# Patient Record
Sex: MALE | Race: WHITE | NOT HISPANIC OR LATINO | Employment: OTHER | RURAL
[De-identification: names, ages, dates, MRNs, and addresses within clinical notes are randomized per-mention and may not be internally consistent; named-entity substitution may affect disease eponyms.]

---

## 2020-08-15 ENCOUNTER — HISTORICAL (OUTPATIENT)
Dept: ADMINISTRATIVE | Facility: HOSPITAL | Age: 70
End: 2020-08-15

## 2020-08-15 LAB
ALBUMIN SERPL BCP-MCNC: 3.7 G/DL (ref 3.5–5)
ALBUMIN/GLOB SERPL: 1.1 {RATIO}
ALP SERPL-CCNC: 88 U/L (ref 45–115)
ALT SERPL W P-5'-P-CCNC: 33 U/L (ref 16–61)
ANION GAP SERPL CALCULATED.3IONS-SCNC: 9 MMOL/L
AST SERPL W P-5'-P-CCNC: 41 U/L (ref 15–37)
BASOPHILS # BLD AUTO: 0.03 X10E3/UL (ref 0–0.2)
BASOPHILS NFR BLD AUTO: 0.5 % (ref 0–1)
BILIRUB SERPL-MCNC: 0.5 MG/DL (ref 0–1.2)
BUN SERPL-MCNC: 12 MG/DL (ref 7–18)
BUN/CREAT SERPL: 10.8
CALCIUM SERPL-MCNC: 8.3 MG/DL (ref 8.5–10.1)
CHLORIDE SERPL-SCNC: 103 MMOL/L (ref 98–107)
CO2 SERPL-SCNC: 30 MMOL/L (ref 21–32)
CREAT SERPL-MCNC: 1.11 MG/DL (ref 0.7–1.3)
EOSINOPHIL # BLD AUTO: 0.14 X10E3/UL (ref 0–0.5)
EOSINOPHIL NFR BLD AUTO: 2.2 % (ref 1–4)
ERYTHROCYTE [DISTWIDTH] IN BLOOD BY AUTOMATED COUNT: 14.8 % (ref 11.5–14.5)
GLOBULIN SER-MCNC: 3.3 G/DL (ref 2–4)
GLUCOSE SERPL-MCNC: 91 MG/DL (ref 74–106)
HCT VFR BLD AUTO: 37.7 % (ref 40–54)
HGB BLD-MCNC: 11.8 G/DL (ref 13.5–18)
IMM GRANULOCYTES # BLD AUTO: 0.02 X10E3/UL (ref 0–0.04)
IMM GRANULOCYTES NFR BLD: 0.3 % (ref 0–0.4)
LYMPHOCYTES # BLD AUTO: 2.06 X10E3/UL (ref 1–4.8)
LYMPHOCYTES NFR BLD AUTO: 32.9 % (ref 27–41)
MAGNESIUM SERPL-MCNC: 2.4 MG/DL (ref 1.7–2.3)
MCH RBC QN AUTO: 26.8 PG (ref 27–31)
MCHC RBC AUTO-ENTMCNC: 31.3 G/DL (ref 32–36)
MCV RBC AUTO: 85.5 FL (ref 80–96)
MONOCYTES # BLD AUTO: 0.98 X10E3/UL (ref 0–0.8)
MONOCYTES NFR BLD AUTO: 15.6 % (ref 2–6)
MPC BLD CALC-MCNC: 9.2 FL (ref 9.4–12.4)
NEUTROPHILS # BLD AUTO: 3.04 X10E3/UL (ref 1.8–7.7)
NEUTROPHILS NFR BLD AUTO: 48.5 % (ref 53–65)
PLATELET # BLD AUTO: 248 X10E3/UL (ref 150–400)
POTASSIUM SERPL-SCNC: 3.9 MMOL/L (ref 3.5–5.1)
PROT SERPL-MCNC: 7 G/DL (ref 6.4–8.2)
RBC # BLD AUTO: 4.41 X10E6/UL (ref 4.6–6.2)
SODIUM SERPL-SCNC: 138 MMOL/L (ref 136–145)
TROPONIN I SERPL-MCNC: <0.017 NG/ML (ref 0–0.06)
WBC # BLD AUTO: 6.27 X10E3/UL (ref 4.5–11)

## 2020-09-29 ENCOUNTER — HISTORICAL (OUTPATIENT)
Dept: ADMINISTRATIVE | Facility: HOSPITAL | Age: 70
End: 2020-09-29

## 2020-09-29 LAB
ALBUMIN SERPL BCP-MCNC: 4.1 G/DL (ref 3.5–5)
ALP SERPL-CCNC: 91 U/L (ref 45–115)
ALT SERPL W P-5'-P-CCNC: 28 U/L (ref 16–61)
ANION GAP SERPL CALCULATED.3IONS-SCNC: 9 MMOL/L (ref 7–16)
AST SERPL W P-5'-P-CCNC: 25 U/L (ref 15–37)
BASOPHILS # BLD AUTO: 0.07 X10E3/UL (ref 0–0.2)
BASOPHILS NFR BLD AUTO: 1 % (ref 0–1)
BILIRUB DIRECT SERPL-MCNC: 0.2 MG/DL (ref 0–0.2)
BILIRUB SERPL-MCNC: 0.6 MG/DL (ref 0–1.2)
BUN SERPL-MCNC: 15 MG/DL (ref 7–18)
CALCIUM SERPL-MCNC: 9.2 MG/DL (ref 8.5–10.1)
CHLORIDE SERPL-SCNC: 104 MMOL/L (ref 98–107)
CO2 SERPL-SCNC: 31 MMOL/L (ref 21–32)
CREAT SERPL-MCNC: 1.11 MG/DL (ref 0.7–1.3)
EOSINOPHIL # BLD AUTO: 0.17 X10E3/UL (ref 0–0.5)
EOSINOPHIL NFR BLD AUTO: 2.4 % (ref 1–4)
ERYTHROCYTE [DISTWIDTH] IN BLOOD BY AUTOMATED COUNT: 15 % (ref 11.5–14.5)
GLUCOSE SERPL-MCNC: 96 MG/DL (ref 74–106)
HCT VFR BLD AUTO: 42.6 % (ref 40–54)
HGB BLD-MCNC: 13.4 G/DL (ref 13.5–18)
IMM GRANULOCYTES # BLD AUTO: 0.01 X10E3/UL (ref 0–0.04)
IMM GRANULOCYTES NFR BLD: 0.1 % (ref 0–0.4)
LYMPHOCYTES # BLD AUTO: 2.2 X10E3/UL (ref 1–4.8)
LYMPHOCYTES NFR BLD AUTO: 31.6 % (ref 27–41)
MCH RBC QN AUTO: 26.4 PG (ref 27–31)
MCHC RBC AUTO-ENTMCNC: 31.5 G/DL (ref 32–36)
MCV RBC AUTO: 83.9 FL (ref 80–96)
MONOCYTES # BLD AUTO: 0.83 X10E3/UL (ref 0–0.8)
MONOCYTES NFR BLD AUTO: 11.9 % (ref 2–6)
MPC BLD CALC-MCNC: 10.7 FL (ref 9.4–12.4)
NEUTROPHILS # BLD AUTO: 3.68 X10E3/UL (ref 1.8–7.7)
NEUTROPHILS NFR BLD AUTO: 53 % (ref 53–65)
NRBC # BLD AUTO: 0 X10E3/UL (ref 0–0)
NRBC, AUTO (.00): 0 /100 (ref 0–0)
PLATELET # BLD AUTO: 234 X10E3/UL (ref 150–400)
POTASSIUM SERPL-SCNC: 3.6 MMOL/L (ref 3.5–5.1)
PROT SERPL-MCNC: 7.4 G/DL (ref 6.4–8.2)
RBC # BLD AUTO: 5.08 X10E6/UL (ref 4.6–6.2)
SODIUM SERPL-SCNC: 140 MMOL/L (ref 136–145)
WBC # BLD AUTO: 6.96 X10E3/UL (ref 4.5–11)

## 2021-02-03 ENCOUNTER — TELEPHONE (OUTPATIENT)
Dept: NEUROLOGY | Facility: CLINIC | Age: 71
End: 2021-02-03

## 2021-02-12 ENCOUNTER — TELEPHONE (OUTPATIENT)
Dept: NEUROLOGY | Facility: CLINIC | Age: 71
End: 2021-02-12

## 2021-02-17 ENCOUNTER — HISTORICAL (OUTPATIENT)
Dept: ADMINISTRATIVE | Facility: HOSPITAL | Age: 71
End: 2021-02-17

## 2021-02-17 LAB — SARS-COV+SARS-COV-2 AG RESP QL IA.RAPID: NEGATIVE

## 2021-02-22 ENCOUNTER — HISTORICAL (OUTPATIENT)
Dept: ADMINISTRATIVE | Facility: HOSPITAL | Age: 71
End: 2021-02-22

## 2021-02-22 LAB — SARS-COV+SARS-COV-2 AG RESP QL IA.RAPID: NEGATIVE

## 2021-06-19 ENCOUNTER — HOSPITAL ENCOUNTER (EMERGENCY)
Facility: HOSPITAL | Age: 71
Discharge: HOME | End: 2021-06-19
Attending: EMERGENCY MEDICINE
Payer: MEDICARE

## 2021-06-19 VITALS
WEIGHT: 180 LBS | SYSTOLIC BLOOD PRESSURE: 145 MMHG | HEIGHT: 67 IN | TEMPERATURE: 98 F | DIASTOLIC BLOOD PRESSURE: 95 MMHG | BODY MASS INDEX: 28.25 KG/M2 | RESPIRATION RATE: 18 BRPM | OXYGEN SATURATION: 96 % | HEART RATE: 69 BPM

## 2021-06-19 DIAGNOSIS — R07.89 CHEST WALL PAIN: Primary | ICD-10-CM

## 2021-06-19 DIAGNOSIS — R07.9 CHEST PAIN: ICD-10-CM

## 2021-06-19 LAB
ALBUMIN SERPL BCP-MCNC: 3.9 G/DL (ref 3.5–5)
ALBUMIN/GLOB SERPL: 1.2 {RATIO}
ALP SERPL-CCNC: 94 U/L (ref 45–115)
ALT SERPL W P-5'-P-CCNC: 34 U/L (ref 16–61)
ANION GAP SERPL CALCULATED.3IONS-SCNC: 12 MMOL/L (ref 7–16)
AST SERPL W P-5'-P-CCNC: 29 U/L (ref 15–37)
BASOPHILS # BLD AUTO: 0.02 K/UL (ref 0–0.2)
BASOPHILS NFR BLD AUTO: 0.3 % (ref 0–1)
BILIRUB SERPL-MCNC: 0.5 MG/DL (ref 0–1.2)
BUN SERPL-MCNC: 10 MG/DL (ref 7–18)
BUN/CREAT SERPL: 9 (ref 6–20)
CALCIUM SERPL-MCNC: 9 MG/DL (ref 8.5–10.1)
CHLORIDE SERPL-SCNC: 102 MMOL/L (ref 98–107)
CO2 SERPL-SCNC: 28 MMOL/L (ref 21–32)
CREAT SERPL-MCNC: 1.07 MG/DL (ref 0.7–1.3)
DIFFERENTIAL METHOD BLD: ABNORMAL
EOSINOPHIL # BLD AUTO: 0.17 K/UL (ref 0–0.5)
EOSINOPHIL NFR BLD AUTO: 2.6 % (ref 1–4)
ERYTHROCYTE [DISTWIDTH] IN BLOOD BY AUTOMATED COUNT: 14.9 % (ref 11.5–14.5)
GLOBULIN SER-MCNC: 3.2 G/DL (ref 2–4)
GLUCOSE SERPL-MCNC: 98 MG/DL (ref 74–106)
HCT VFR BLD AUTO: 38.5 % (ref 40–54)
HGB BLD-MCNC: 12.5 G/DL (ref 13.5–18)
IMM GRANULOCYTES # BLD AUTO: 0.02 K/UL (ref 0–0.04)
IMM GRANULOCYTES NFR BLD: 0.3 % (ref 0–0.4)
LYMPHOCYTES # BLD AUTO: 2.02 K/UL (ref 1–4.8)
LYMPHOCYTES NFR BLD AUTO: 30.9 % (ref 27–41)
MCH RBC QN AUTO: 27.2 PG (ref 27–31)
MCHC RBC AUTO-ENTMCNC: 32.5 G/DL (ref 32–36)
MCV RBC AUTO: 83.9 FL (ref 80–96)
MONOCYTES # BLD AUTO: 0.73 K/UL (ref 0–0.8)
MONOCYTES NFR BLD AUTO: 11.2 % (ref 2–6)
MPC BLD CALC-MCNC: 9.3 FL (ref 9.4–12.4)
NEUTROPHILS # BLD AUTO: 3.57 K/UL (ref 1.8–7.7)
NEUTROPHILS NFR BLD AUTO: 54.7 % (ref 53–65)
NT-PROBNP SERPL-MCNC: 94 PG/ML (ref 1–125)
PLATELET # BLD AUTO: 260 K/UL (ref 150–400)
POTASSIUM SERPL-SCNC: 3.6 MMOL/L (ref 3.5–5.1)
PROT SERPL-MCNC: 7.1 G/DL (ref 6.4–8.2)
RBC # BLD AUTO: 4.59 M/UL (ref 4.6–6.2)
SODIUM SERPL-SCNC: 138 MMOL/L (ref 136–145)
TROPONIN I SERPL-MCNC: <0.017 NG/ML
WBC # BLD AUTO: 6.53 K/UL (ref 4.5–11)

## 2021-06-19 PROCEDURE — 25000003 PHARM REV CODE 250: Performed by: EMERGENCY MEDICINE

## 2021-06-19 PROCEDURE — 84484 ASSAY OF TROPONIN QUANT: CPT | Performed by: EMERGENCY MEDICINE

## 2021-06-19 PROCEDURE — 83880 ASSAY OF NATRIURETIC PEPTIDE: CPT | Performed by: EMERGENCY MEDICINE

## 2021-06-19 PROCEDURE — 99283 EMERGENCY DEPT VISIT LOW MDM: CPT | Performed by: EMERGENCY MEDICINE

## 2021-06-19 PROCEDURE — 99285 EMERGENCY DEPT VISIT HI MDM: CPT | Mod: 25

## 2021-06-19 PROCEDURE — 93010 ELECTROCARDIOGRAM REPORT: CPT | Mod: ,,, | Performed by: EMERGENCY MEDICINE

## 2021-06-19 PROCEDURE — 85025 COMPLETE CBC W/AUTO DIFF WBC: CPT | Performed by: EMERGENCY MEDICINE

## 2021-06-19 PROCEDURE — 93010 EKG 12-LEAD: ICD-10-PCS | Mod: ,,, | Performed by: EMERGENCY MEDICINE

## 2021-06-19 PROCEDURE — 80053 COMPREHEN METABOLIC PANEL: CPT | Performed by: EMERGENCY MEDICINE

## 2021-06-19 PROCEDURE — 93005 ELECTROCARDIOGRAM TRACING: CPT

## 2021-06-19 PROCEDURE — 36415 COLL VENOUS BLD VENIPUNCTURE: CPT | Performed by: EMERGENCY MEDICINE

## 2021-06-19 RX ORDER — ALBUTEROL SULFATE 90 UG/1
2 AEROSOL, METERED RESPIRATORY (INHALATION)
COMMUNITY
Start: 2020-12-28

## 2021-06-19 RX ORDER — NAPROXEN 500 MG/1
500 TABLET ORAL 2 TIMES DAILY PRN
Qty: 10 TABLET | Refills: 0 | Status: SHIPPED | OUTPATIENT
Start: 2021-06-19 | End: 2021-06-24

## 2021-06-19 RX ORDER — OXYCODONE HYDROCHLORIDE 15 MG/1
15 TABLET ORAL 3 TIMES DAILY
COMMUNITY
Start: 2021-01-14

## 2021-06-19 RX ORDER — EPINEPHRINE 0.3 MG/.3ML
0.3 INJECTION SUBCUTANEOUS
COMMUNITY
End: 2021-06-19

## 2021-06-19 RX ORDER — SILDENAFIL CITRATE 20 MG/1
20 TABLET ORAL 2 TIMES DAILY
COMMUNITY

## 2021-06-19 RX ORDER — BACLOFEN 10 MG/1
10 TABLET ORAL 2 TIMES DAILY
COMMUNITY
Start: 2021-01-14 | End: 2022-04-09 | Stop reason: ALTCHOICE

## 2021-06-19 RX ORDER — ALBUTEROL SULFATE 0.83 MG/ML
2.5 SOLUTION RESPIRATORY (INHALATION)
COMMUNITY

## 2021-06-19 RX ORDER — PREDNISOLONE ACETATE 10 MG/ML
1 SUSPENSION/ DROPS OPHTHALMIC 4 TIMES DAILY
COMMUNITY

## 2021-06-19 RX ORDER — ASPIRIN 325 MG
325 TABLET ORAL
Status: COMPLETED | OUTPATIENT
Start: 2021-06-19 | End: 2021-06-19

## 2021-06-19 RX ORDER — DULOXETIN HYDROCHLORIDE 60 MG/1
60 CAPSULE, DELAYED RELEASE ORAL DAILY
COMMUNITY
End: 2021-06-19

## 2021-06-19 RX ORDER — PANTOPRAZOLE SODIUM 40 MG/1
40 TABLET, DELAYED RELEASE ORAL DAILY
COMMUNITY
Start: 2020-12-31 | End: 2022-04-09 | Stop reason: ALTCHOICE

## 2021-06-19 RX ADMIN — SODIUM CHLORIDE 500 ML: 0.9 INJECTION, SOLUTION INTRAVENOUS at 09:06

## 2021-06-19 RX ADMIN — ASPIRIN 325 MG ORAL TABLET 325 MG: 325 PILL ORAL at 09:06

## 2021-06-20 ENCOUNTER — TELEPHONE (OUTPATIENT)
Dept: EMERGENCY MEDICINE | Facility: HOSPITAL | Age: 71
End: 2021-06-20

## 2021-09-20 DIAGNOSIS — M26.629 ARTHRALGIA OF TEMPOROMANDIBULAR JOINT: Primary | ICD-10-CM

## 2021-09-21 ENCOUNTER — CLINICAL SUPPORT (OUTPATIENT)
Dept: REHABILITATION | Facility: HOSPITAL | Age: 71
End: 2021-09-21
Payer: MEDICARE

## 2021-09-21 DIAGNOSIS — R68.84 CHRONIC JAW PAIN: ICD-10-CM

## 2021-09-21 DIAGNOSIS — M26.622 ARTHRALGIA OF LEFT TEMPOROMANDIBULAR JOINT: ICD-10-CM

## 2021-09-21 DIAGNOSIS — M26.629 ARTHRALGIA OF TEMPOROMANDIBULAR JOINT: ICD-10-CM

## 2021-09-21 DIAGNOSIS — G89.29 CHRONIC JAW PAIN: ICD-10-CM

## 2021-09-21 PROCEDURE — 97110 THERAPEUTIC EXERCISES: CPT

## 2021-09-21 PROCEDURE — 97035 APP MDLTY 1+ULTRASOUND EA 15: CPT

## 2021-09-21 PROCEDURE — 97014 ELECTRIC STIMULATION THERAPY: CPT

## 2021-09-23 ENCOUNTER — CLINICAL SUPPORT (OUTPATIENT)
Dept: REHABILITATION | Facility: HOSPITAL | Age: 71
End: 2021-09-23
Payer: MEDICARE

## 2021-09-23 DIAGNOSIS — G89.29 CHRONIC JAW PAIN: ICD-10-CM

## 2021-09-23 DIAGNOSIS — R68.84 CHRONIC JAW PAIN: ICD-10-CM

## 2021-09-23 PROCEDURE — 97110 THERAPEUTIC EXERCISES: CPT

## 2021-09-23 PROCEDURE — 97035 APP MDLTY 1+ULTRASOUND EA 15: CPT

## 2021-10-05 ENCOUNTER — DOCUMENTATION ONLY (OUTPATIENT)
Dept: REHABILITATION | Facility: HOSPITAL | Age: 71
End: 2021-10-05

## 2021-10-05 PROBLEM — G89.29 CHRONIC JAW PAIN: Status: RESOLVED | Noted: 2021-09-21 | Resolved: 2021-10-05

## 2021-10-05 PROBLEM — R68.84 CHRONIC JAW PAIN: Status: RESOLVED | Noted: 2021-09-21 | Resolved: 2021-10-05

## 2021-10-25 ENCOUNTER — OFFICE VISIT (OUTPATIENT)
Dept: OTOLARYNGOLOGY | Facility: CLINIC | Age: 71
End: 2021-10-25
Payer: MEDICARE

## 2021-10-25 VITALS — HEIGHT: 69 IN | WEIGHT: 179 LBS | BODY MASS INDEX: 26.51 KG/M2

## 2021-10-25 DIAGNOSIS — G51.8 FACIAL NEURALGIA: ICD-10-CM

## 2021-10-25 DIAGNOSIS — J32.8 OTHER CHRONIC SINUSITIS: Primary | ICD-10-CM

## 2021-10-25 PROCEDURE — 99204 PR OFFICE/OUTPT VISIT, NEW, LEVL IV, 45-59 MIN: ICD-10-PCS | Mod: S$PBB,,, | Performed by: OTOLARYNGOLOGY

## 2021-10-25 PROCEDURE — 99204 OFFICE O/P NEW MOD 45 MIN: CPT | Mod: S$PBB,,, | Performed by: OTOLARYNGOLOGY

## 2021-10-25 PROCEDURE — 99213 OFFICE O/P EST LOW 20 MIN: CPT | Mod: PBBFAC | Performed by: OTOLARYNGOLOGY

## 2022-04-09 ENCOUNTER — HOSPITAL ENCOUNTER (EMERGENCY)
Facility: HOSPITAL | Age: 72
Discharge: HOME | End: 2022-04-09
Attending: FAMILY MEDICINE
Payer: MEDICARE

## 2022-04-09 VITALS
OXYGEN SATURATION: 98 % | SYSTOLIC BLOOD PRESSURE: 121 MMHG | DIASTOLIC BLOOD PRESSURE: 68 MMHG | BODY MASS INDEX: 29.03 KG/M2 | TEMPERATURE: 98 F | HEIGHT: 67 IN | HEART RATE: 87 BPM | RESPIRATION RATE: 18 BRPM | WEIGHT: 185 LBS

## 2022-04-09 DIAGNOSIS — S02.40CB: ICD-10-CM

## 2022-04-09 DIAGNOSIS — M54.2 NECK PAIN: ICD-10-CM

## 2022-04-09 DIAGNOSIS — S02.40EA CLOSED FRACTURE OF RIGHT ZYGOMATIC ARCH, INITIAL ENCOUNTER: ICD-10-CM
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
## 2022-04-09 DIAGNOSIS — M54.9 DORSALGIA: ICD-10-CM

## 2022-04-09 DIAGNOSIS — S02.40DB: ICD-10-CM

## 2022-04-09 DIAGNOSIS — R51.9 FACIAL PAIN, ACUTE: ICD-10-CM

## 2022-04-09 DIAGNOSIS — S02.85XB: ICD-10-CM

## 2022-04-09 DIAGNOSIS — R22.0 FACIAL SWELLING: ICD-10-CM

## 2022-04-09 PROCEDURE — 99285 EMERGENCY DEPT VISIT HI MDM: CPT | Mod: 25

## 2022-04-09 PROCEDURE — 29130 APPL FINGER SPLINT STATIC: CPT

## 2022-04-09 PROCEDURE — 99284 EMERGENCY DEPT VISIT MOD MDM: CPT | Performed by: FAMILY MEDICINE

## 2022-04-09 PROCEDURE — 63600175 PHARM REV CODE 636 W HCPCS: Performed by: FAMILY MEDICINE

## 2022-04-09 PROCEDURE — 96372 THER/PROPH/DIAG INJ SC/IM: CPT

## 2022-04-09 RX ORDER — NAPROXEN 500 MG/1
500 TABLET ORAL 2 TIMES DAILY WITH MEALS
COMMUNITY

## 2022-04-09 RX ORDER — TADALAFIL 20 MG/1
10 TABLET ORAL DAILY
COMMUNITY

## 2022-04-09 RX ORDER — DULOXETIN HYDROCHLORIDE 60 MG/1
60 CAPSULE, DELAYED RELEASE ORAL DAILY
COMMUNITY

## 2022-04-09 RX ORDER — CYANOCOBALAMIN 1000 UG/ML
1000 INJECTION, SOLUTION INTRAMUSCULAR; SUBCUTANEOUS
COMMUNITY

## 2022-04-09 RX ORDER — NORTRIPTYLINE HYDROCHLORIDE 50 MG/1
50 CAPSULE ORAL NIGHTLY
COMMUNITY

## 2022-04-09 RX ORDER — EPINEPHRINE 0.15 MG/.3ML
0.15 INJECTION INTRAMUSCULAR
COMMUNITY

## 2022-04-09 RX ORDER — BUDESONIDE AND FORMOTEROL FUMARATE DIHYDRATE 160; 4.5 UG/1; UG/1
2 AEROSOL RESPIRATORY (INHALATION) EVERY 12 HOURS
COMMUNITY

## 2022-04-09 RX ORDER — EPLERENONE 25 MG/1
25 TABLET, FILM COATED ORAL DAILY
COMMUNITY

## 2022-04-09 RX ORDER — SUMATRIPTAN SUCCINATE 100 MG/1
100 TABLET ORAL DAILY PRN
COMMUNITY

## 2022-04-09 RX ORDER — NALOXONE HYDROCHLORIDE 4 MG/.1ML
1 SPRAY NASAL DAILY PRN
COMMUNITY

## 2022-04-09 RX ORDER — ESOMEPRAZOLE MAGNESIUM 40 MG/1
40 CAPSULE, DELAYED RELEASE ORAL
COMMUNITY

## 2022-04-09 RX ORDER — CEFTRIAXONE 1 G/1
1 INJECTION, POWDER, FOR SOLUTION INTRAMUSCULAR; INTRAVENOUS
Status: COMPLETED | OUTPATIENT
Start: 2022-04-09 | End: 2022-04-09

## 2022-04-09 RX ORDER — AMOXICILLIN AND CLAVULANATE POTASSIUM 875; 125 MG/1; MG/1
1 TABLET, FILM COATED ORAL 2 TIMES DAILY
Qty: 14 TABLET | Refills: 0 | Status: SHIPPED | OUTPATIENT
Start: 2022-04-09

## 2022-04-09 RX ADMIN — CEFTRIAXONE SODIUM 1 G: 1 INJECTION, POWDER, FOR SOLUTION INTRAMUSCULAR; INTRAVENOUS at 05:04

## 2022-04-09 NOTE — ED NOTES
PREPARING PT FOR DISCHARGE IV DISCONTINUED- MD ORDERED IV ROCEPHIN - MD AWARE IV ALREADY DISCONTINUED - ROCEPHIN CHANGED TO IM INJECTION AS ORDERED PER MD- PT GIVEN MED WITHOUT DIFFICULTY

## 2022-04-09 NOTE — ED NOTES
Pt requested to be transferred to [facility] in [place]- [facility] declined due to full capacity- pt to aware- pt states ok to go to [facility] - [facility] aware of pt ok to go [facility]

## 2022-04-09 NOTE — DISCHARGE INSTRUCTIONS
Augmentin, 875mg.  Take 1 every 12 hrs for 10 days.  DO NOT blow nose, increase pressure to nose/mouth, etc.    F/u with Dr. Diggs, otolaryngologist (ENT) specialist, next available appointment.  [phone]  Return sooner if fever, unable to breathe, or otherwise worse.

## 2022-04-09 NOTE — PROVIDER PROGRESS NOTES - EMERGENCY DEPT.
Encounter Date: 4/9/2022    ED Physician Progress Notes        Physician Note:   Dr. Diggs, ENT on call for pt's preferred hospital.  He looked at the patient's CT scans and said this is not operative.  Dr. Diggs definitely recommends ENT follow up, but he recommended pt d/c home with ENT follow up in clinic.  Discussed with pt.

## 2022-04-09 NOTE — ED PROVIDER NOTES
Encounter Date: 4/9/2022       History   No chief complaint on file.    Pt was letting his dog out and he turned which caused his feet to bind up and he fell.  He hit his face in the nose area causing nosebleed and a black eye on the right.  His only known blood thinner is aspirin and an NSAID that he takes for arthritis.  He c/o neck and back pain, facial pain and swelling.  No LOC.          Review of patient's allergies indicates:   Allergen Reactions    Ace inhibitors     Arb-angiotensin receptor antagonist      Past Medical History:   Diagnosis Date    Arthritis     Asthma     Cancer     Hypertension      Past Surgical History:   Procedure Laterality Date    BACK SURGERY      BLADDER SURGERY      CARPAL TUNNEL RELEASE      CATARACT EXTRACTION, BILATERAL      CHOLECYSTECTOMY      KNEE SURGERY      Occipital craniectomy      SHOULDER SURGERY      SINUS SURGERY       Family History   Problem Relation Age of Onset    Heart failure Mother     Heart disease Mother     Stroke Mother     Cancer Father     Heart failure Father     Heart disease Father     Diabetes Sister     Heart failure Sister     Heart disease Sister     Diabetes Paternal Aunt     Colon cancer Neg Hx     Colon polyps Neg Hx     Stomach cancer Neg Hx     Rectal cancer Neg Hx     Esophageal cancer Neg Hx      Social History     Tobacco Use    Smoking status: Never Smoker    Smokeless tobacco: Never Used   Substance Use Topics    Alcohol use: Yes     Comment: occasional drinker    Drug use: No     Review of Systems   HENT: Positive for facial swelling and nosebleeds (upon initial injury.  Hemostatic upon arrival in ED.).    Musculoskeletal: Positive for back pain and neck pain.   All other systems reviewed and are negative.      Physical Exam     Initial Vitals [04/09/22 1326]   BP Pulse Resp Temp SpO2   (!) 131/93 99 18 97.7 °F (36.5 °C) (!) 93 %      MAP       --         Physical Exam    Nursing note and vitals  reviewed.  Constitutional: He appears well-developed and well-nourished.   HENT:   Head: Normocephalic.   Mild swelling around bridge of nose, and moderate swelling of right upper and lower eyelids with ecchymosis noted.    Eyes: EOM are normal.   Pupils pinpoint.  (Pt is on oxycodone for chronic pain.)   Neck: Neck supple. No tracheal deviation present. No JVD present.   Normal range of motion.  Cardiovascular: Normal rate, regular rhythm, normal heart sounds and intact distal pulses. Exam reveals no gallop and no friction rub.    No murmur heard.  Pulmonary/Chest: Breath sounds normal. No stridor. No respiratory distress. He has no wheezes. He has no rhonchi. He has no rales.   BS are equal bilat.   Abdominal: Abdomen is soft. Bowel sounds are normal. He exhibits no distension. There is no abdominal tenderness. There is no rebound and no guarding.   Musculoskeletal:         General: No tenderness or edema. Normal range of motion.      Cervical back: Normal range of motion and neck supple.     Neurological: He is alert and oriented to person, place, and time.   Skin: Skin is warm and dry. Capillary refill takes less than 2 seconds.   Psychiatric: He has a normal mood and affect.         Medical Screening Exam   See Full Note    ED Course   Procedures  Labs Reviewed - No data to display       Imaging Results    None          Medications - No data to display                    Clinical Impression:   Final diagnoses:  [W19.XXXA] Fall, initial encounter (Primary)  [R51.9] Facial pain, acute  [R22.0] Facial swelling  [M54.2] Neck pain  [M54.9] Dorsalgia                 Renaldo Soriano MD  04/09/22 3750

## 2022-04-09 NOTE — ED TRIAGE NOTES
Pt received via [facility] with c/o fall prior to arrival- pt states he was getting up to let his dog out- pt states he stumbled and fell against the end table -denies loc

## 2022-04-09 NOTE — ED NOTES
Pt states does no need anything for pain - pain states I am under the care of pain management dr guan on mobile

## 2022-04-09 NOTE — ED NOTES
REMOVED DRIED BLOOD FROM FACE, LIPS , HANDS, AND CHEST AREA - CLEANSED PT WITH WARM WATER AND HIBICLENS WITH 4X4'S- PT TOLERATED - PT GIVEN SIPS OF SPRITE- PT TOLERATED WITHOUT DIFFICULTY - PT RIDE CALLED AT [phone]

## 2022-04-09 NOTE — ED NOTES
Pt quite in room - pt voices no complaints - swelling noted to facial area and bruising - md to further review ct reports - continuing to monitor and assist pt as needed

## 2022-04-10 ENCOUNTER — TELEPHONE (OUTPATIENT)
Dept: EMERGENCY MEDICINE | Facility: HOSPITAL | Age: 72
End: 2022-04-10
Payer: MEDICARE

## 2023-09-27 ENCOUNTER — OFFICE VISIT (OUTPATIENT)
Dept: OTOLARYNGOLOGY | Facility: CLINIC | Age: 73
End: 2023-09-27
Payer: MEDICARE

## 2023-09-27 VITALS — HEIGHT: 67 IN | BODY MASS INDEX: 29.03 KG/M2 | WEIGHT: 185 LBS

## 2023-09-27 DIAGNOSIS — R09.81 CHRONIC NASAL CONGESTION: ICD-10-CM

## 2023-09-27 DIAGNOSIS — J32.8 OTHER CHRONIC SINUSITIS: ICD-10-CM

## 2023-09-27 DIAGNOSIS — H92.12 OTORRHEA OF LEFT EAR: Primary | ICD-10-CM

## 2023-09-27 PROCEDURE — 99214 OFFICE O/P EST MOD 30 MIN: CPT | Mod: PBBFAC | Performed by: OTOLARYNGOLOGY

## 2023-09-27 PROCEDURE — 99214 OFFICE O/P EST MOD 30 MIN: CPT | Mod: S$PBB,,, | Performed by: OTOLARYNGOLOGY

## 2023-09-27 PROCEDURE — 99214 PR OFFICE/OUTPT VISIT, EST, LEVL IV, 30-39 MIN: ICD-10-PCS | Mod: S$PBB,,, | Performed by: OTOLARYNGOLOGY

## 2023-09-27 RX ORDER — NEOMYCIN SULFATE, POLYMYXIN B SULFATE AND HYDROCORTISONE 10; 3.5; 1 MG/ML; MG/ML; [USP'U]/ML
3 SUSPENSION/ DROPS AURICULAR (OTIC) 2 TIMES DAILY
Qty: 10 ML | Refills: 0 | Status: SHIPPED | OUTPATIENT
Start: 2023-09-27

## 2023-09-27 RX ORDER — AZELASTINE 1 MG/ML
1 SPRAY, METERED NASAL 2 TIMES DAILY
Qty: 30 ML | Refills: 0 | Status: SHIPPED | OUTPATIENT
Start: 2023-09-27 | End: 2024-09-26

## 2023-09-27 RX ORDER — AMOXICILLIN AND CLAVULANATE POTASSIUM 500; 125 MG/1; MG/1
1 TABLET, FILM COATED ORAL 2 TIMES DAILY
Qty: 28 TABLET | Refills: 0 | Status: SHIPPED | OUTPATIENT
Start: 2023-09-27

## 2023-10-11 ENCOUNTER — OFFICE VISIT (OUTPATIENT)
Dept: OTOLARYNGOLOGY | Facility: CLINIC | Age: 73
End: 2023-10-11
Payer: MEDICARE

## 2023-10-11 VITALS — WEIGHT: 185 LBS | BODY MASS INDEX: 29.03 KG/M2 | HEIGHT: 67 IN

## 2023-10-11 DIAGNOSIS — H92.12 OTORRHEA OF LEFT EAR: ICD-10-CM

## 2023-10-11 DIAGNOSIS — R09.81 CHRONIC NASAL CONGESTION: Primary | ICD-10-CM

## 2023-10-11 DIAGNOSIS — J32.8 OTHER CHRONIC SINUSITIS: ICD-10-CM

## 2023-10-11 PROCEDURE — 99214 PR OFFICE/OUTPT VISIT, EST, LEVL IV, 30-39 MIN: ICD-10-PCS | Mod: S$PBB,,, | Performed by: OTOLARYNGOLOGY

## 2023-10-11 PROCEDURE — 99214 OFFICE O/P EST MOD 30 MIN: CPT | Mod: PBBFAC | Performed by: OTOLARYNGOLOGY

## 2023-10-11 PROCEDURE — 99214 OFFICE O/P EST MOD 30 MIN: CPT | Mod: S$PBB,,, | Performed by: OTOLARYNGOLOGY

## 2023-10-11 RX ORDER — AMOXICILLIN AND CLAVULANATE POTASSIUM 500; 125 MG/1; MG/1
1 TABLET, FILM COATED ORAL 2 TIMES DAILY
Qty: 28 TABLET | Refills: 0 | Status: SHIPPED | OUTPATIENT
Start: 2023-10-11

## 2023-10-11 NOTE — PROGRESS NOTES
Subjective:       Patient ID: Raul Rodriguez is a 73 y.o. male.    Chief Complaint: Sinusitis (2 week follow-up on sinuses and left ear drainage. )  Coughing up yellow drainage  Sinusitis  Associated symptoms include ear pain and sinus pressure.     Review of Systems   HENT:  Positive for ear discharge, ear pain, postnasal drip, rhinorrhea and sinus pressure.    All other systems reviewed and are negative.      Objective:      Physical Exam  General: NAD  Head: Normocephalic, atraumatic, no facial asymmetry/normal strength,  Ears: Both auricules normal in appearance, w/o deformities tympanic membranes left fluid external auditory canals normal  Nose: External nose w/o deformities normal turbinates no drainage or inflammation  Oral Cavity: Lips, gums, floor of mouth, tongue hard palate, and buccal mucosa without mass/lesion  Oropharynx: Mucosa pink and moist, soft palate, posterior pharynx and oropharyngeal wall without mass/lesion  Neck: Supple, symmetric, trachea midline, no palpable mass/lesion, no palpable cervical lymphadenopathy  Skin: Warm and dry, no concerning lesions  Respiratory: Respirations even, unlabored   Assessment:       1. Chronic nasal congestion    2. Otorrhea of left ear    3. Other chronic sinusitis        Plan:     Canal is better stop drops  Augmentin   F/u 1 month

## 2023-11-08 ENCOUNTER — OFFICE VISIT (OUTPATIENT)
Dept: OTOLARYNGOLOGY | Facility: CLINIC | Age: 73
End: 2023-11-08
Payer: MEDICARE

## 2023-11-08 VITALS — WEIGHT: 185 LBS | HEIGHT: 67 IN | BODY MASS INDEX: 29.03 KG/M2

## 2023-11-08 DIAGNOSIS — H65.22 LEFT CHRONIC SEROUS OTITIS MEDIA: ICD-10-CM

## 2023-11-08 DIAGNOSIS — H90.2 CONDUCTIVE HEARING LOSS, UNSPECIFIED LATERALITY: ICD-10-CM

## 2023-11-08 DIAGNOSIS — R09.81 CHRONIC NASAL CONGESTION: Primary | ICD-10-CM

## 2023-11-08 DIAGNOSIS — J32.8 OTHER CHRONIC SINUSITIS: ICD-10-CM

## 2023-11-08 PROCEDURE — 99214 OFFICE O/P EST MOD 30 MIN: CPT | Mod: PBBFAC | Performed by: OTOLARYNGOLOGY

## 2023-11-08 PROCEDURE — 99214 PR OFFICE/OUTPT VISIT, EST, LEVL IV, 30-39 MIN: ICD-10-PCS | Mod: S$PBB,,, | Performed by: OTOLARYNGOLOGY

## 2023-11-08 PROCEDURE — 99214 OFFICE O/P EST MOD 30 MIN: CPT | Mod: S$PBB,,, | Performed by: OTOLARYNGOLOGY

## 2023-11-08 RX ORDER — AZELASTINE 1 MG/ML
1 SPRAY, METERED NASAL 2 TIMES DAILY
Qty: 30 ML | Refills: 1 | Status: SHIPPED | OUTPATIENT
Start: 2023-11-08 | End: 2024-11-07

## 2023-12-20 ENCOUNTER — OFFICE VISIT (OUTPATIENT)
Dept: OTOLARYNGOLOGY | Facility: CLINIC | Age: 73
End: 2023-12-20
Payer: MEDICARE

## 2023-12-20 VITALS — BODY MASS INDEX: 29.03 KG/M2 | HEIGHT: 67 IN | WEIGHT: 185 LBS

## 2023-12-20 DIAGNOSIS — H69.92 ETD (EUSTACHIAN TUBE DYSFUNCTION), LEFT: ICD-10-CM

## 2023-12-20 DIAGNOSIS — H65.22 LEFT CHRONIC SEROUS OTITIS MEDIA: Primary | ICD-10-CM

## 2023-12-20 DIAGNOSIS — H90.2 CONDUCTIVE HEARING LOSS, UNSPECIFIED LATERALITY: ICD-10-CM

## 2023-12-20 PROCEDURE — 99214 PR OFFICE/OUTPT VISIT, EST, LEVL IV, 30-39 MIN: ICD-10-PCS | Mod: S$PBB,,, | Performed by: OTOLARYNGOLOGY

## 2023-12-20 PROCEDURE — 99214 OFFICE O/P EST MOD 30 MIN: CPT | Mod: PBBFAC | Performed by: OTOLARYNGOLOGY

## 2023-12-20 PROCEDURE — 99214 OFFICE O/P EST MOD 30 MIN: CPT | Mod: S$PBB,,, | Performed by: OTOLARYNGOLOGY

## 2024-01-17 ENCOUNTER — OFFICE VISIT (OUTPATIENT)
Dept: OTOLARYNGOLOGY | Facility: CLINIC | Age: 74
End: 2024-01-17
Payer: MEDICARE

## 2024-01-17 VITALS — HEIGHT: 67 IN | BODY MASS INDEX: 29.03 KG/M2 | WEIGHT: 185 LBS

## 2024-01-17 DIAGNOSIS — H65.22 LEFT CHRONIC SEROUS OTITIS MEDIA: Primary | ICD-10-CM

## 2024-01-17 DIAGNOSIS — H90.2 CONDUCTIVE HEARING LOSS, UNSPECIFIED LATERALITY: ICD-10-CM

## 2024-01-17 PROCEDURE — 99215 OFFICE O/P EST HI 40 MIN: CPT | Mod: PBBFAC | Performed by: OTOLARYNGOLOGY

## 2024-01-17 PROCEDURE — 99214 OFFICE O/P EST MOD 30 MIN: CPT | Mod: S$PBB,,, | Performed by: OTOLARYNGOLOGY

## 2024-01-17 NOTE — H&P (VIEW-ONLY)
Subjective:       Patient ID: Raul Rodriguez is a 73 y.o. male.    Chief Complaint: Follow-up (Follow up for left ear.) and Otalgia (Patient complaining of pain in his left ear.)  Decreased hearing left ear   Follow-up    Otalgia   Associated symptoms include hearing loss.     Review of Systems   HENT:  Positive for ear pain and hearing loss.    All other systems reviewed and are negative.      Objective:      Physical Exam  General: NAD  Head: Normocephalic, atraumatic, no facial asymmetry/normal strength,  Ears: Both auricules normal in appearance, w/o deformities tympanic membranes fluid left retracted external auditory canals normal  Nose: External nose w/o deformities normal turbinates no drainage or inflammation  Oral Cavity: Lips, gums, floor of mouth, tongue hard palate, and buccal mucosa without mass/lesion  Oropharynx: Mucosa pink and moist, soft palate, posterior pharynx and oropharyngeal wall without mass/lesion  Neck: Supple, symmetric, trachea midline, no palpable mass/lesion, no palpable cervical lymphadenopathy  Skin: Warm and dry, no concerning lesions  Respiratory: Respirations even, unlabored  Assessment:       1. Left chronic serous otitis media    2. Conductive hearing loss, unspecified laterality        Plan:       Left tube in OR

## 2024-01-30 ENCOUNTER — ANESTHESIA (OUTPATIENT)
Dept: SURGERY | Facility: HOSPITAL | Age: 74
End: 2024-01-30
Payer: MEDICARE

## 2024-01-30 ENCOUNTER — ANESTHESIA EVENT (OUTPATIENT)
Dept: SURGERY | Facility: HOSPITAL | Age: 74
End: 2024-01-30
Payer: MEDICARE

## 2024-01-30 ENCOUNTER — HOSPITAL ENCOUNTER (OUTPATIENT)
Facility: HOSPITAL | Age: 74
Discharge: HOME | End: 2024-01-30
Attending: OTOLARYNGOLOGY | Admitting: OTOLARYNGOLOGY
Payer: MEDICARE

## 2024-01-30 VITALS
HEART RATE: 62 BPM | DIASTOLIC BLOOD PRESSURE: 87 MMHG | TEMPERATURE: 98 F | OXYGEN SATURATION: 99 % | RESPIRATION RATE: 18 BRPM | SYSTOLIC BLOOD PRESSURE: 131 MMHG

## 2024-01-30 DIAGNOSIS — H65.23 CHRONIC SEROUS OTITIS MEDIA OF BOTH EARS: ICD-10-CM

## 2024-01-30 PROCEDURE — 25000003 PHARM REV CODE 250: Performed by: OTOLARYNGOLOGY

## 2024-01-30 PROCEDURE — 71000015 HC POSTOP RECOV 1ST HR: Performed by: OTOLARYNGOLOGY

## 2024-01-30 PROCEDURE — D9220A PRA ANESTHESIA: Mod: CRNA,,, | Performed by: NURSE ANESTHETIST, CERTIFIED REGISTERED

## 2024-01-30 PROCEDURE — 63600175 PHARM REV CODE 636 W HCPCS: Performed by: NURSE ANESTHETIST, CERTIFIED REGISTERED

## 2024-01-30 PROCEDURE — 69436 CREATE EARDRUM OPENING: CPT | Mod: LT,,, | Performed by: OTOLARYNGOLOGY

## 2024-01-30 PROCEDURE — 36000704 HC OR TIME LEV I 1ST 15 MIN: Performed by: OTOLARYNGOLOGY

## 2024-01-30 PROCEDURE — 37000008 HC ANESTHESIA 1ST 15 MINUTES: Performed by: OTOLARYNGOLOGY

## 2024-01-30 PROCEDURE — D9220A PRA ANESTHESIA: Mod: ANES,,, | Performed by: ANESTHESIOLOGY

## 2024-01-30 PROCEDURE — 25000003 PHARM REV CODE 250: Performed by: NURSE ANESTHETIST, CERTIFIED REGISTERED

## 2024-01-30 PROCEDURE — 27201423 OPTIME MED/SURG SUP & DEVICES STERILE SUPPLY: Performed by: OTOLARYNGOLOGY

## 2024-01-30 PROCEDURE — 71000033 HC RECOVERY, INTIAL HOUR: Performed by: OTOLARYNGOLOGY

## 2024-01-30 DEVICE — GROMMET MOD ARMSTR 1.14MM: Type: IMPLANTABLE DEVICE | Site: EAR | Status: FUNCTIONAL

## 2024-01-30 RX ORDER — PROPOFOL 10 MG/ML
VIAL (ML) INTRAVENOUS
Status: DISCONTINUED | OUTPATIENT
Start: 2024-01-30 | End: 2024-01-30

## 2024-01-30 RX ORDER — MEPERIDINE HYDROCHLORIDE 25 MG/ML
25 INJECTION INTRAMUSCULAR; INTRAVENOUS; SUBCUTANEOUS EVERY 10 MIN PRN
Status: DISCONTINUED | OUTPATIENT
Start: 2024-01-30 | End: 2024-01-30 | Stop reason: HOSPADM

## 2024-01-30 RX ORDER — MORPHINE SULFATE 10 MG/ML
4 INJECTION INTRAMUSCULAR; INTRAVENOUS; SUBCUTANEOUS EVERY 5 MIN PRN
Status: DISCONTINUED | OUTPATIENT
Start: 2024-01-30 | End: 2024-01-30 | Stop reason: HOSPADM

## 2024-01-30 RX ORDER — DIPHENHYDRAMINE HYDROCHLORIDE 50 MG/ML
25 INJECTION INTRAMUSCULAR; INTRAVENOUS EVERY 6 HOURS PRN
Status: DISCONTINUED | OUTPATIENT
Start: 2024-01-30 | End: 2024-01-30 | Stop reason: HOSPADM

## 2024-01-30 RX ORDER — ONDANSETRON HYDROCHLORIDE 2 MG/ML
4 INJECTION, SOLUTION INTRAVENOUS DAILY PRN
Status: DISCONTINUED | OUTPATIENT
Start: 2024-01-30 | End: 2024-01-30 | Stop reason: HOSPADM

## 2024-01-30 RX ORDER — CIPROFLOXACIN AND DEXAMETHASONE 3; 1 MG/ML; MG/ML
SUSPENSION/ DROPS AURICULAR (OTIC)
Status: DISCONTINUED | OUTPATIENT
Start: 2024-01-30 | End: 2024-01-30 | Stop reason: HOSPADM

## 2024-01-30 RX ORDER — SODIUM CHLORIDE 9 MG/ML
INJECTION, SOLUTION INTRAVENOUS CONTINUOUS
Status: DISCONTINUED | OUTPATIENT
Start: 2024-01-30 | End: 2024-01-30 | Stop reason: HOSPADM

## 2024-01-30 RX ORDER — HYDROMORPHONE HYDROCHLORIDE 2 MG/ML
0.5 INJECTION, SOLUTION INTRAMUSCULAR; INTRAVENOUS; SUBCUTANEOUS EVERY 5 MIN PRN
Status: DISCONTINUED | OUTPATIENT
Start: 2024-01-30 | End: 2024-01-30 | Stop reason: HOSPADM

## 2024-01-30 RX ORDER — LIDOCAINE HYDROCHLORIDE 20 MG/ML
INJECTION INTRAVENOUS
Status: DISCONTINUED | OUTPATIENT
Start: 2024-01-30 | End: 2024-01-30

## 2024-01-30 RX ADMIN — LIDOCAINE HYDROCHLORIDE 100 MG: 20 INJECTION, SOLUTION INTRAVENOUS at 08:01

## 2024-01-30 RX ADMIN — PROPOFOL 150 MG: 10 INJECTION, EMULSION INTRAVENOUS at 08:01

## 2024-01-30 RX ADMIN — SODIUM CHLORIDE: 9 INJECTION, SOLUTION INTRAVENOUS at 06:01

## 2024-01-30 NOTE — ANESTHESIA POSTPROCEDURE EVALUATION
Anesthesia Post Evaluation    Patient: Raul Rodriguez    Procedure(s) Performed: Procedure(s) (LRB):  MYRINGOTOMY, WITH TYMPANOSTOMY TUBE INSERTION (Left)    Final Anesthesia Type: general      Patient location during evaluation: PACU  Post-procedure vital signs: reviewed and stable  Pain management: adequate  Airway patency: patent    PONV status at discharge: No PONV  Anesthetic complications: no      Cardiovascular status: hemodynamically stable  Respiratory status: unassisted  Hydration status: euvolemic  Follow-up not needed.              Vitals Value Taken Time   /87 01/30/24 0906   Temp 36.6 °C (97.9 °F) 01/30/24 0830   Pulse 62 01/30/24 0907   Resp 18 01/30/24 0905   SpO2 99 % 01/30/24 0907   Vitals shown include unvalidated device data.      Event Time   Out of Recovery 09:00:18         Pain/Osorio Score: Osorio Score: 10 (1/30/2024  9:05 AM)

## 2024-01-30 NOTE — TRANSFER OF CARE
Anesthesia Transfer of Care Note    Patient: Raul Rodriguez    Procedure(s) Performed: Procedure(s) (LRB):  MYRINGOTOMY, WITH TYMPANOSTOMY TUBE INSERTION (Left)    Patient location: PACU    Anesthesia Type: general    Transport from OR: Transported from OR on room air with adequate spontaneous ventilation    Post pain: adequate analgesia    Post assessment: no apparent anesthetic complications and tolerated procedure well    Post vital signs: stable    Level of consciousness: responds to stimulation and sedated    Nausea/Vomiting: no nausea/vomiting    Complications: none    Transfer of care protocol was followed      Last vitals: Visit Vitals  BP 97/63 (BP Location: Left arm, Patient Position: Lying)   Pulse 63   Temp 36.6 °C (97.9 °F) (Oral)   Resp 12   SpO2 100%

## 2024-01-30 NOTE — ANESTHESIA PREPROCEDURE EVALUATION
01/30/2024  Raul Rodriguez is a 73 y.o., male.      Pre-op Assessment    I have reviewed the Patient Summary Reports.    I have reviewed the NPO Status.   I have reviewed the Medications.     Review of Systems         Anesthesia Plan  Type of Anesthesia, risks & benefits discussed:    Anesthesia Type: Gen Natural Airway  Intra-op Monitoring Plan: Standard ASA Monitors  Post Op Pain Control Plan: IV/PO Opioids PRN  Induction:  IV  Informed Consent: Informed consent signed with the Patient and all parties understand the risks and agree with anesthesia plan.  All questions answered.   ASA Score: 3    Ready For Surgery From Anesthesia Perspective.     .  NPO greater than 8 hours  No anesthetic complications  Allergies      Ace Inhibitors  Not Specified  11/13/2014      Arb-angiotensin Receptor Antagonist         HTN  Asthma  CHRISTINE  GERD  Overactive bladder    Airway exam deferred (COVID precautions); limited flexion/extension of neck

## 2024-01-30 NOTE — DISCHARGE INSTRUCTIONS
Instill 2 drops in left ear 2 times a day for 2 days.  No water in left ear until follow up appointment.

## 2024-01-30 NOTE — BRIEF OP NOTE
[facility] - Orthopedic Periop Services  Brief Operative Note    Surgery Date: 1/30/2024     Surgeon(s) and Role:     * Kolby Weston MD - Primary    Assisting Surgeon: None    Pre-op Diagnosis:  Left chronic serous otitis media [H65.22]    Post-op Diagnosis:  Post-Op Diagnosis Codes:     * Left chronic serous otitis media [H65.22]    Procedure(s) (LRB):  MYRINGOTOMY, WITH TYMPANOSTOMY TUBE INSERTION (Left)    Anesthesia: General    Operative Findings: Fluid    Estimated Blood Loss: 0         Specimens:   Specimen (24h ago, onward)      None              Discharge Note    OUTCOME: Patient tolerated treatment/procedure well without complication and is now ready for discharge.    DISPOSITION: Home or Self Care    FINAL DIAGNOSIS: ISAI  FOLLOWUP: In clinic      DISCHARGE INSTRUCTIONS:  No discharge procedures on file.

## 2024-01-30 NOTE — OP NOTE
[facility] - Orthopedic Periop Services  Brief Operative Note     Surgery Date: 1/30/2024      Surgeon(s) and Role:     * Kolby Weston MD - Primary     Assisting Surgeon: None     Pre-op Diagnosis:  Left chronic serous otitis media [H65.22]     Post-op Diagnosis:  Post-Op Diagnosis Codes:     * Left chronic serous otitis media [H65.22]     Procedure(s) (LRB):  MYRINGOTOMY, WITH TYMPANOSTOMY TUBE INSERTION (Left)     After general mask anesthesia using the operating microscope the left ear was examined and a myringotomy was performed in the anterior inferior quadrant and a grommet tube was placed without difficulty excess middle ear  fluid was suctioned.  the patient tolerated the procedure well and was reversed taken to RR in stable condition           Anesthesia: General     Operative Findings: Fluid     Estimated Blood Loss: 0

## 2024-02-08 ENCOUNTER — OFFICE VISIT (OUTPATIENT)
Dept: OTOLARYNGOLOGY | Facility: CLINIC | Age: 74
End: 2024-02-08
Payer: MEDICARE

## 2024-02-08 VITALS — WEIGHT: 185 LBS | HEIGHT: 67 IN | BODY MASS INDEX: 29.03 KG/M2

## 2024-02-08 DIAGNOSIS — H65.22 LEFT CHRONIC SEROUS OTITIS MEDIA: Primary | ICD-10-CM

## 2024-02-08 PROCEDURE — 99024 POSTOP FOLLOW-UP VISIT: CPT | Mod: ,,, | Performed by: OTOLARYNGOLOGY

## 2024-02-08 PROCEDURE — 99214 OFFICE O/P EST MOD 30 MIN: CPT | Mod: PBBFAC | Performed by: OTOLARYNGOLOGY

## 2024-02-08 NOTE — PROGRESS NOTES
Subjective:       Patient ID: Raul Rodriguez is a 73 y.o. male.    Chief Complaint: Follow-up (Post-op left P E tube. Patient voices no complaints.)    HPI  Review of Systems    Objective:      Physical Exam  left tube open in place   Assessment:       1. Left chronic serous otitis media        Plan:       F/u 3 months

## 2024-05-06 ENCOUNTER — OFFICE VISIT (OUTPATIENT)
Dept: OTOLARYNGOLOGY | Facility: CLINIC | Age: 74
End: 2024-05-06
Payer: MEDICARE

## 2024-05-06 VITALS — BODY MASS INDEX: 29.03 KG/M2 | WEIGHT: 185 LBS | HEIGHT: 67 IN

## 2024-05-06 DIAGNOSIS — H90.2 CONDUCTIVE HEARING LOSS, UNSPECIFIED LATERALITY: ICD-10-CM

## 2024-05-06 DIAGNOSIS — H65.22 LEFT CHRONIC SEROUS OTITIS MEDIA: Primary | ICD-10-CM

## 2024-05-06 PROCEDURE — 99214 OFFICE O/P EST MOD 30 MIN: CPT | Mod: S$PBB,,, | Performed by: OTOLARYNGOLOGY

## 2024-05-06 PROCEDURE — 99214 OFFICE O/P EST MOD 30 MIN: CPT | Mod: PBBFAC | Performed by: OTOLARYNGOLOGY

## 2024-05-06 NOTE — PROGRESS NOTES
Subjective:       Patient ID: Raul Rodriguez is a 73 y.o. male.    Chief Complaint: Otitis Media (Patient presents paperwork from a surgery that he had on his left ear. States he has wires running from his left ear to his head. This is causing him to have pain in his left eye.), Follow-up (3 month follow up.), and Otalgia (He complains of having pain in his left ear.)    Otitis Media:    Associated symptoms: Ear pain and headaches.    Follow-up  Associated symptoms include headaches and neck pain.   Otalgia   Associated symptoms include headaches and neck pain.     Review of Systems   HENT:  Positive for ear pain.    Musculoskeletal:  Positive for neck pain.   Neurological:  Positive for headaches.   All other systems reviewed and are negative.      Objective:      Physical Exam  General: NAD  Head: Normocephalic, atraumatic, no facial asymmetry/normal strength,  Ears: Both auricules normal in appearance, w/o deformities tympanic membranes tube on left stopped with some blood  external auditory canals normal  Nose: External nose w/o deformities normal turbinates no drainage or inflammation  Oral Cavity: Lips, gums, floor of mouth, tongue hard palate, and buccal mucosa without mass/lesion  Oropharynx: Mucosa pink and moist, soft palate, posterior pharynx and oropharyngeal wall without mass/lesion  Neck: Supple, symmetric, trachea midline, no palpable mass/lesion, no palpable cervical lymphadenopathy  Skin: Warm and dry, no concerning lesions  Respiratory: Respirations even, unlabored  Assessment:       1. Left chronic serous otitis media    2. Conductive hearing loss, unspecified laterality        Plan:       Neurosurgery needs to evaluate pain from their surgery   F/u 3 months to check ear

## 2024-08-05 ENCOUNTER — OFFICE VISIT (OUTPATIENT)
Dept: OTOLARYNGOLOGY | Facility: CLINIC | Age: 74
End: 2024-08-05
Payer: MEDICARE

## 2024-08-05 VITALS — BODY MASS INDEX: 29.03 KG/M2 | WEIGHT: 185 LBS | HEIGHT: 67 IN

## 2024-08-05 DIAGNOSIS — R51.9 FACIAL PAIN: ICD-10-CM

## 2024-08-05 DIAGNOSIS — H90.2 CONDUCTIVE HEARING LOSS, UNSPECIFIED LATERALITY: ICD-10-CM

## 2024-08-05 DIAGNOSIS — H65.22 LEFT CHRONIC SEROUS OTITIS MEDIA: Primary | ICD-10-CM

## 2024-08-05 PROCEDURE — 99214 OFFICE O/P EST MOD 30 MIN: CPT | Mod: S$PBB,,, | Performed by: OTOLARYNGOLOGY

## 2024-08-05 PROCEDURE — 99214 OFFICE O/P EST MOD 30 MIN: CPT | Mod: PBBFAC | Performed by: OTOLARYNGOLOGY

## 2024-08-05 PROCEDURE — 99999 PR PBB SHADOW E&M-EST. PATIENT-LVL IV: CPT | Mod: PBBFAC,,, | Performed by: OTOLARYNGOLOGY

## 2024-09-24 ENCOUNTER — OFFICE VISIT (OUTPATIENT)
Dept: NEUROLOGY | Facility: CLINIC | Age: 74
End: 2024-09-24
Payer: MEDICARE

## 2024-09-24 VITALS
HEIGHT: 67 IN | RESPIRATION RATE: 18 BRPM | OXYGEN SATURATION: 96 % | BODY MASS INDEX: 28.56 KG/M2 | HEART RATE: 65 BPM | DIASTOLIC BLOOD PRESSURE: 72 MMHG | WEIGHT: 182 LBS | SYSTOLIC BLOOD PRESSURE: 116 MMHG

## 2024-09-24 DIAGNOSIS — R51.9 FACIAL PAIN: Primary | ICD-10-CM

## 2024-09-24 DIAGNOSIS — R51.9 ACUTE INTRACTABLE HEADACHE, UNSPECIFIED HEADACHE TYPE: ICD-10-CM

## 2024-09-24 DIAGNOSIS — R27.0 ATAXIA: ICD-10-CM

## 2024-09-24 PROCEDURE — 99215 OFFICE O/P EST HI 40 MIN: CPT | Mod: PBBFAC | Performed by: NURSE PRACTITIONER

## 2024-09-24 PROCEDURE — 99999 PR PBB SHADOW E&M-EST. PATIENT-LVL V: CPT | Mod: PBBFAC,,, | Performed by: NURSE PRACTITIONER

## 2024-09-24 PROCEDURE — 99203 OFFICE O/P NEW LOW 30 MIN: CPT | Mod: S$PBB,,, | Performed by: NURSE PRACTITIONER

## 2024-09-24 RX ORDER — OXYCODONE AND ACETAMINOPHEN 10; 325 MG/1; MG/1
TABLET ORAL
COMMUNITY

## 2024-09-24 RX ORDER — VIBEGRON 75 MG/1
TABLET, FILM COATED ORAL
COMMUNITY
Start: 2024-07-16

## 2024-09-24 RX ORDER — CARBAMAZEPINE 100 MG/1
100 TABLET, EXTENDED RELEASE ORAL 2 TIMES DAILY
Qty: 60 TABLET | Refills: 11 | Status: SHIPPED | OUTPATIENT
Start: 2024-09-24 | End: 2025-09-24

## 2024-09-24 RX ORDER — PREGABALIN 50 MG/1
50 CAPSULE ORAL
COMMUNITY

## 2024-09-24 RX ORDER — LAMOTRIGINE 25 MG/1
TABLET ORAL
Qty: 120 TABLET | Refills: 5 | Status: SHIPPED | OUTPATIENT
Start: 2024-09-24

## 2024-09-24 RX ORDER — HYDROXYCHLOROQUINE SULFATE 200 MG/1
TABLET, FILM COATED ORAL
COMMUNITY
Start: 2024-03-22

## 2024-09-24 RX ORDER — TRIAMCINOLONE ACETONIDE 40 MG/ML
40 INJECTION, SUSPENSION INTRA-ARTICULAR; INTRAMUSCULAR
COMMUNITY
Start: 2024-05-16

## 2024-09-24 RX ORDER — DOXAZOSIN 2 MG/1
2 TABLET ORAL
COMMUNITY

## 2024-09-24 RX ORDER — OXYBUTYNIN CHLORIDE 10 MG/1
10 TABLET, EXTENDED RELEASE ORAL
COMMUNITY

## 2024-09-24 RX ORDER — POLYETHYLENE GLYCOL 3350, SODIUM SULFATE ANHYDROUS, SODIUM BICARBONATE, SODIUM CHLORIDE, POTASSIUM CHLORIDE 236; 22.74; 6.74; 5.86; 2.97 G/4L; G/4L; G/4L; G/4L; G/4L
POWDER, FOR SOLUTION ORAL
COMMUNITY

## 2024-09-24 RX ORDER — FENTANYL 12.5 UG/1
1 PATCH TRANSDERMAL
COMMUNITY

## 2024-09-24 RX ORDER — OXYBUTYNIN CHLORIDE 5 MG/1
TABLET, EXTENDED RELEASE ORAL
COMMUNITY

## 2024-09-24 RX ORDER — METOPROLOL SUCCINATE 50 MG/1
50 TABLET, EXTENDED RELEASE ORAL
COMMUNITY
Start: 2024-02-05

## 2024-09-24 RX ORDER — NEBULIZER AND COMPRESSOR
EACH MISCELLANEOUS
COMMUNITY
Start: 2024-08-28

## 2024-09-24 RX ORDER — LORAZEPAM 1 MG/1
TABLET ORAL
Qty: 2 TABLET | Refills: 0 | Status: SHIPPED | OUTPATIENT
Start: 2024-09-24

## 2024-09-24 RX ORDER — LIDOCAINE HYDROCHLORIDE 10 MG/ML
2 INJECTION, SOLUTION INFILTRATION; PERINEURAL
COMMUNITY
Start: 2024-05-16

## 2024-09-24 RX ORDER — ERGOCALCIFEROL 1.25 MG/1
CAPSULE ORAL
COMMUNITY

## 2024-09-24 RX ORDER — NIFEDIPINE 30 MG/1
30 TABLET, EXTENDED RELEASE ORAL
COMMUNITY

## 2024-09-24 RX ORDER — AMLODIPINE BESYLATE 10 MG/1
10 TABLET ORAL
COMMUNITY

## 2024-09-24 RX ORDER — BACLOFEN 10 MG/1
10 TABLET ORAL
COMMUNITY

## 2024-09-24 RX ORDER — NIFEDIPINE 30 MG/1
TABLET, EXTENDED RELEASE ORAL
COMMUNITY

## 2024-09-24 RX ORDER — TROSPIUM CHLORIDE 20 MG/1
TABLET, FILM COATED ORAL
COMMUNITY
Start: 2024-08-13

## 2024-09-24 NOTE — PATIENT INSTRUCTIONS
MRI brain and cervical spine  Reviewed records present in the EMR  Release for prior records from [place]  Trial with tegretol 100mg twice a day  Continue to follow-up with pain treatment in [place], [place]  Release for last clinic note from Dr. Enamorado in [place], [place]

## 2024-09-24 NOTE — PROGRESS NOTES
Subjective:       Patient ID: Raul Rodriguez is a 73 y.o. male     Chief Complaint:  No chief complaint on file.       Allergies:  Ace inhibitors and Arb-angiotensin receptor antagonist    Current Medications:    Outpatient Encounter Medications as of 9/24/2024   Medication Sig Dispense Refill    albuterol (PROVENTIL) 2.5 mg /3 mL (0.083 %) nebulizer solution Inhale 2.5 mg into the lungs.      amLODIPine (NORVASC) 10 MG tablet Take 10 mg by mouth.      amoxicillin-clavulanate 500-125mg (AUGMENTIN) 500-125 mg Tab Take 1 tablet (500 mg total) by mouth 2 (two) times daily. 28 tablet 0    amoxicillin-clavulanate 500-125mg (AUGMENTIN) 500-125 mg Tab Take 1 tablet (500 mg total) by mouth 2 (two) times daily. 28 tablet 0    amoxicillin-clavulanate 875-125mg (AUGMENTIN) 875-125 mg per tablet Take 1 tablet by mouth 2 (two) times daily. 14 tablet 0    aspirin 81 MG Chew Take 81 mg by mouth as needed.      azelastine (ASTELIN) 137 mcg (0.1 %) nasal spray 1 spray (137 mcg total) by Nasal route 2 (two) times daily. 30 mL 0    azelastine (ASTELIN) 137 mcg (0.1 %) nasal spray 1 spray (137 mcg total) by Nasal route 2 (two) times daily. 30 mL 1    baclofen (LIORESAL) 10 MG tablet Take 10 mg by mouth.      bisacodyl (DULCOLAX) 5 mg EC tablet Take 5 mg by mouth as needed for Constipation.      budesonide-formoterol 160-4.5 mcg (SYMBICORT) 160-4.5 mcg/actuation HFAA Inhale 2 puffs into the lungs every 12 (twelve) hours. Controller      cyanocobalamin 1,000 mcg/mL injection 1,000 mcg.      doxazosin (CARDURA) 2 MG tablet Take 2 mg by mouth.      DULoxetine (CYMBALTA) 60 MG capsule Take 60 mg by mouth once daily.      EPINEPHrine (EPIPEN JR) 0.15 mg/0.3 mL pen injection Inject 0.15 mg into the muscle as needed for Anaphylaxis.      eplerenone (INSPRA) 25 MG Tab Take 25 mg by mouth once daily.      ergocalciferol (ERGOCALCIFEROL) 50,000 unit Cap       esomeprazole (NEXIUM) 40 MG capsule Take 40 mg by mouth before breakfast.       fentaNYL (DURAGESIC) 12 mcg/hr PT72 Place 1 patch onto the skin.      GEMTESA 75 mg Tab       hydroxychloroquine (PLAQUENIL) 200 mg tablet Take 1 tablet twice a day by oral route.      LIDOcaine HCL 10 mg/ml, 1%, 10 mg/mL (1 %) injection Inject 2 mLs into the articular space.      metoprolol succinate (TOPROL-XL) 50 MG 24 hr tablet Take 50 mg by mouth.      naloxone (NARCAN) 4 mg/actuation Spry 1 spray by Nasal route daily as needed.      naproxen (NAPROSYN) 500 MG tablet Take 500 mg by mouth 2 (two) times daily with meals.      nebulizer and compressor Jaylyn Use as directed.  Dispense nebulizer, tubing and mask/mouth piece.      neomycin-polymyxin-hydrocortisone (CORTISPORIN) 3.5-10,000-1 mg/mL-unit/mL-% otic suspension Place 3 drops into the left ear 2 (two) times daily. 10 mL 0    NIFEdipine (ADALAT CC) 30 MG TbSR Take 30 mg by mouth.      NIFEdipine (PROCARDIA-XL) 30 MG (OSM) 24 hr tablet       nortriptyline (PAMELOR) 50 MG capsule Take 50 mg by mouth every evening.      omalizumab (XOLAIR) 75 mg/0.5 mL injection Inject 75 mg into the skin every 28 days.      oxybutynin (DITROPAN-XL) 10 MG 24 hr tablet Take 10 mg by mouth.      oxybutynin (DITROPAN-XL) 5 MG TR24 TAKE 1 TABLET BY MOUTH DAILY FOR BLADDER CONTROL      oxyCODONE (ROXICODONE) 15 MG Tab Take 15 mg by mouth 3 (three) times daily.      oxyCODONE-acetaminophen (PERCOCET)  mg per tablet Take 1 tablet every 6 hours by oral route.      polyethylene glycol (GAVILYTE-G) 236-22.74-6.74 -5.86 gram suspension       polyethylene glycol (GLYCOLAX) 17 gram PwPk Take by mouth.      prednisoLONE acetate (PRED FORTE) 1 % DrpS Place 1 drop into the left eye 4 (four) times daily.      pregabalin (LYRICA) 50 MG capsule Take 50 mg by mouth.      sildenafil (REVATIO) 20 mg Tab Take 20 mg by mouth 2 (two) times a day.      sumatriptan (IMITREX) 100 MG tablet Take 100 mg by mouth daily as needed for Migraine.      tadalafiL (CIALIS) 20 MG Tab Take 10 mg by mouth once  "daily.      triamcinolone acetonide (KENALOG-40) 40 mg/mL injection Inject 40 mg into the articular space.      trospium (SANCTURA) 20 mg Tab tablet       VENTOLIN HFA 90 mcg/actuation inhaler Inhale 2 puffs into the lungs as needed.      zolpidem (AMBIEN) 10 mg Tab Take 5 mg by mouth nightly as needed.      carBAMazepine (TEGRETOL XR) 100 MG 12 hr tablet Take 1 tablet (100 mg total) by mouth 2 (two) times daily. 60 tablet 11     No facility-administered encounter medications on file as of 9/24/2024.       History of Present Illness  74 y/o male new referral to our neurology clinic for chronic left facial pain    Per the EMR, this is long standing.  Records in the EMR indicate this ongoing since at least 2012.  Per prior note of Dr. Cherry in [facility] neurology at [facility] in 2014, patient had already at that time been evaluated here in [facility], and in [place].  He was actually seen here by Dr. Denis in 2014, but this was for rule out of ALS, at that time was following with the neurology at [facility].    Had gamma knife procedure done without benefit, and prior treated with lyrica, neurontin without benefit.  Had been seen in pain treatment locally with Dr. Jensen and had local injections without benefit.  I note in records from [place] in 2018 he actually had MRI brain done there with emphasis on the trigeminal nerves and no abnormalities were noted except the prior gamma knife procedure changes, though there was also reported history of chiari malformation surgery with occipital craniotomy and removal of posterior arch of C1 and C2 due to the chiari malformation.  Apparently he even had a stimulator placed at [place] in 2019 by his report but he had to have it removed because he said it was causing him too much "pull" every time he turned his head.  Again this is his report, I can certainly try to obtain records from [place].    There was no mention of prior tegretol, trileptal, or lamictal use in any of the " records.    He is currently already under the care of pain treatment by Dr. Enamorado in [place], [place] for this same facial pain.  On oxycodone per his report, cymbalta, with prior use of pamelor, lyrica, and neurontin.  Will try to get copy of his last notes.    Review of the records indicates extensive workup and treatment for thsi condition, including facial nerve specialty and specialty procedures.  I feel quite certain he likely was prior treated with tegretol as it is first line treatment for neuralgia but I do not have any clarification it was used.  We certainly can try him with it, though his report is not typical of [place], given gamma knife was not successful, and he is reporting symptoms of the entire of left side of head, posterior and anterior which would be far outside of the range of the trigeminal nerve.  His symptoms began long after he had prior chiari malformation surgery by his report.  I will obtain updated MRI brain and cervical spine as he is reporting falls which he says PT has not helped.            Review of Systems  Review of Systems   Constitutional:  Negative for diaphoresis and fever.   HENT:  Negative for congestion, hearing loss and tinnitus.    Eyes:  Negative for blurred vision, double vision, photophobia, discharge and redness.   Respiratory:  Negative for cough and shortness of breath.    Cardiovascular:  Negative for chest pain.   Gastrointestinal:  Negative for abdominal pain, nausea and vomiting.   Musculoskeletal:  Positive for back pain, falls, myalgias and neck pain. Negative for joint pain.   Skin:  Negative for itching and rash.   Neurological:  Positive for dizziness, tingling, sensory change and headaches. Negative for tremors, speech change, focal weakness, seizures, loss of consciousness and weakness.   Psychiatric/Behavioral:  Negative for depression, hallucinations and memory loss. The patient does not have insomnia.    All other systems reviewed and are negative.      Objective:     NEUROLOGICAL EXAMINATION:     MENTAL STATUS   Oriented to person, place, and time.   Attention: normal. Concentration: normal.   Speech: speech is normal   Level of consciousness: alert  Knowledge: good and consistent with education.   Normal comprehension.     CRANIAL NERVES     CN II   Visual fields full to confrontation.   Visual acuity: normal  Right visual field deficit: none  Left visual field deficit: none     CN III, IV, VI   Pupils are equal, round, and reactive to light.  Extraocular motions are normal.   Right pupil: Size: 3 mm. Shape: regular. Reactivity: brisk. Consensual response: intact. Accommodation: intact.   Left pupil: Size: 3 mm. Shape: regular. Reactivity: brisk. Consensual response: intact. Accommodation: intact.   CN III: no CN III palsy  CN VI: no CN VI palsy  Nystagmus: none   Diplopia: none  Upgaze: normal  Downgaze: normal  Conjugate gaze: present  Vestibulo-ocular reflex: present    CN V   Facial sensation intact.   Right facial sensation deficit: none  Left facial sensation deficit: none  Right corneal reflex: normal  Left corneal reflex: normal    CN VII   Facial expression full, symmetric.   Right facial weakness: none  Left facial weakness: peripheral  Left taste: normal    CN VIII   CN VIII normal.   Hearing: intact  Right Rinne: AC > BC  Left Rinne: BC > AC  Springer: lateralizes right     CN IX, X   CN IX normal.   CN X normal.   Palate: symmetric    CN XI   CN XI normal.   Right sternocleidomastoid strength: normal  Left sternocleidomastoid strength: normal  Right trapezius strength: normal  Left trapezius strength: normal    CN XII   CN XII normal.   Tongue: not atrophic  Fasciculations: absent  Tongue deviation: none    MOTOR EXAM   Muscle bulk: normal  Overall muscle tone: normal  Right arm tone: normal  Left arm tone: normal  Right arm pronator drift: absent  Left arm pronator drift: absent  Right leg tone: normal  Left leg tone: normal    Strength   Right neck  flexion: 5/5  Left neck flexion: 5  Right neck extension:   Left neck extension:   Right deltoid:   Left deltoid:   Right biceps:   Left biceps:   Right triceps:   Left triceps:   Right wrist flexion:   Left wrist flexion:   Right wrist extension:   Left wrist extension:   Right interossei:   Left interossei:   Right iliopsoas:   Left iliopsoas:   Right quadriceps:   Left quadriceps:   Right hamstrin/5  Left hamstrin/5  Right anterior tibial:   Left anterior tibial:   Right posterior tibial:   Left posterior tibial:   Right gastroc:   Left gastroc:     REFLEXES     Reflexes   Right brachioradialis: 2+  Left brachioradialis: 2+  Right biceps: 2+  Left biceps: 2+  Right triceps: 2+  Left triceps: 2+  Right patellar: 2+  Left patellar: 2+  Right achilles: 2+  Left achilles: 2+  Right plantar: normal  Left plantar: normal  Right Son: absent  Left Son: absent  Right ankle clonus: absent  Left ankle clonus: absent  Right pendular knee jerk: absent  Left pendular knee jerk: absent    SENSORY EXAM   Light touch normal.   Right arm light touch: normal  Left arm light touch: normal  Right leg light touch: normal  Left leg light touch: normal  Vibration normal.   Right arm vibration: normal  Left arm vibration: normal  Right leg vibration: normal  Left leg vibration: normal  Proprioception normal.   Right arm proprioception: normal  Left arm proprioception: normal  Right leg proprioception: normal  Left leg proprioception: normal  Pinprick normal.   Right arm pinprick: normal  Left arm pinprick: normal  Right leg pinprick: normal  Left leg pinprick: normal  Graphesthesia: normal  Romberg: negative  Stereognosis: normal    GAIT AND COORDINATION     Gait  Gait: normal     Coordination   Finger to nose coordination: normal  Heel to shin coordination: normal  Tandem walking coordination: normal    Tremor   Resting tremor: absent  Intention  tremor: absent  Action tremor: absent       Physical Exam  Vitals and nursing note reviewed.   Constitutional:       Appearance: Normal appearance.   HENT:      Head: Normocephalic.      Ears:      Springer exam findings: Lateralizes right.     Right Rinne: AC > BC.     Left Rinne: BC > AC.  Eyes:      Extraocular Movements: EOM normal.      Pupils: Pupils are equal, round, and reactive to light.   Cardiovascular:      Rate and Rhythm: Normal rate and regular rhythm.   Pulmonary:      Effort: Pulmonary effort is normal.   Musculoskeletal:         General: Normal range of motion.      Cervical back: Normal range of motion and neck supple.   Skin:     General: Skin is warm and dry.   Neurological:      General: No focal deficit present.      Mental Status: He is alert and oriented to person, place, and time.      Cranial Nerves: No cranial nerve deficit.      Sensory: No sensory deficit.      Motor: No weakness.      Coordination: Coordination normal. Finger-Nose-Finger Test, Heel to Shin Test and Romberg Test normal.      Gait: Gait is intact. Gait and tandem walk normal.      Deep Tendon Reflexes: Reflexes normal.      Reflex Scores:       Tricep reflexes are 2+ on the right side and 2+ on the left side.       Bicep reflexes are 2+ on the right side and 2+ on the left side.       Brachioradialis reflexes are 2+ on the right side and 2+ on the left side.       Patellar reflexes are 2+ on the right side and 2+ on the left side.       Achilles reflexes are 2+ on the right side and 2+ on the left side.  Psychiatric:         Mood and Affect: Mood normal.         Speech: Speech normal.         Behavior: Behavior normal.          Assessment:     Problem List Items Addressed This Visit          Neuro    Acute intractable headache - Primary    Relevant Medications    carBAMazepine (TEGRETOL XR) 100 MG 12 hr tablet    Other Relevant Orders    MRI Brain Without Contrast     Other Visit Diagnoses       Ataxia        Relevant  Orders    MRI Cervical Spine Without Contrast             Primary Diagnosis and ICD10  Facial pain [R51.9]    Plan:     Patient Instructions   MRI brain and cervical spine  Reviewed records present in the EMR  Release for prior records from [place]  Trial with tegretol 100mg twice a day  Continue to follow-up with pain treatment in [place], [place]  Release for last clinic note from Dr. Enamorado in [place], [place]    There are no discontinued medications.    Requested Prescriptions     Signed Prescriptions Disp Refills    carBAMazepine (TEGRETOL XR) 100 MG 12 hr tablet 60 tablet 11     Sig: Take 1 tablet (100 mg total) by mouth 2 (two) times daily.       Orders Placed This Encounter   Procedures    MRI Brain Without Contrast    MRI Cervical Spine Without Contrast

## 2024-10-14 ENCOUNTER — TELEPHONE (OUTPATIENT)
Dept: NEUROLOGY | Facility: CLINIC | Age: 74
End: 2024-10-14
Payer: MEDICARE

## 2024-10-14 ENCOUNTER — HOSPITAL ENCOUNTER (OUTPATIENT)
Dept: RADIOLOGY | Facility: HOSPITAL | Age: 74
Discharge: HOME | End: 2024-10-14
Attending: NURSE PRACTITIONER
Payer: MEDICARE

## 2024-10-14 DIAGNOSIS — R51.9 ACUTE INTRACTABLE HEADACHE, UNSPECIFIED HEADACHE TYPE: ICD-10-CM

## 2024-10-14 DIAGNOSIS — R27.0 ATAXIA: ICD-10-CM

## 2024-10-14 PROCEDURE — 70551 MRI BRAIN STEM W/O DYE: CPT | Mod: 26,,, | Performed by: RADIOLOGY

## 2024-10-14 PROCEDURE — 72141 MRI NECK SPINE W/O DYE: CPT | Mod: TC

## 2024-10-14 PROCEDURE — 70551 MRI BRAIN STEM W/O DYE: CPT | Mod: TC

## 2024-10-14 PROCEDURE — 72141 MRI NECK SPINE W/O DYE: CPT | Mod: 26,,, | Performed by: RADIOLOGY

## 2024-10-14 NOTE — TELEPHONE ENCOUNTER
LEFT VM.    ----- Message from LAURA Epperson sent at 10/14/2024  1:00 PM CDT -----  No acute abnormalities reported on the MRI brain

## 2024-10-14 NOTE — TELEPHONE ENCOUNTER
LEFT VM.    ----- Message from LAURA Epperson sent at 10/14/2024  1:03 PM CDT -----  MRI cervcial, mild stenosis at  C4-5, C5-6, no cord compression or abnormal cord signal reported by radiology

## 2024-10-14 NOTE — TELEPHONE ENCOUNTER
LEFT ANOTHER VM.    ----- Message from Charlene sent at 10/14/2024  3:54 PM CDT -----  Pt returning a call about MRI results. I see a VM was left but he said he has a house phone and dont know nothing about a VM. [phone].  Who Called: Raul Rodriguez    Caller is requesting information on test results.        Preferred Method of Contact: Phone Call  Patient's Preferred Phone Number on File: [phone]   Best Call Back Number, if different:  Additional Information:

## 2024-10-15 ENCOUNTER — TELEPHONE (OUTPATIENT)
Dept: NEUROLOGY | Facility: CLINIC | Age: 74
End: 2024-10-15
Payer: MEDICARE

## 2024-10-15 NOTE — TELEPHONE ENCOUNTER
Returned call to pt gave him MRI brain and C spine results. He v/u.        ----- Message from Charlene sent at 10/15/2024  9:59 AM CDT -----  Pt returning a call from yesterday. He said he doesn't have VM so he can't get them or doesn't know how to. He needs to speak with someone.  [phone].  Who Called: Raul Rodriguez    Caller is requesting information on test results.    Name of Test (Lab/Mammo/Etc): MRI        Patient's Preferred Phone Number on File: [phone]   Best Call Back Number, if different:  Additional Information:

## 2024-12-31 ENCOUNTER — OFFICE VISIT (OUTPATIENT)
Dept: NEUROLOGY | Facility: CLINIC | Age: 74
End: 2024-12-31
Payer: MEDICARE

## 2024-12-31 VITALS
DIASTOLIC BLOOD PRESSURE: 57 MMHG | HEIGHT: 67 IN | OXYGEN SATURATION: 99 % | HEART RATE: 65 BPM | BODY MASS INDEX: 27.62 KG/M2 | RESPIRATION RATE: 18 BRPM | SYSTOLIC BLOOD PRESSURE: 102 MMHG | WEIGHT: 176 LBS

## 2024-12-31 DIAGNOSIS — R51.9 FACIAL PAIN: Primary | ICD-10-CM

## 2024-12-31 PROCEDURE — 99215 OFFICE O/P EST HI 40 MIN: CPT | Mod: PBBFAC | Performed by: NURSE PRACTITIONER

## 2024-12-31 PROCEDURE — 99999 PR PBB SHADOW E&M-EST. PATIENT-LVL V: CPT | Mod: PBBFAC,,, | Performed by: NURSE PRACTITIONER

## 2024-12-31 PROCEDURE — 99213 OFFICE O/P EST LOW 20 MIN: CPT | Mod: S$PBB,,, | Performed by: NURSE PRACTITIONER

## 2024-12-31 RX ORDER — TESTOSTERONE CYPIONATE 200 MG/ML
INJECTION, SOLUTION INTRAMUSCULAR
COMMUNITY
Start: 2024-10-31

## 2024-12-31 RX ORDER — DUTASTERIDE 0.5 MG/1
0.5 CAPSULE, LIQUID FILLED ORAL
COMMUNITY

## 2024-12-31 RX ORDER — LAMOTRIGINE 100 MG/1
100 TABLET ORAL 2 TIMES DAILY
Qty: 60 TABLET | Refills: 11 | Status: SHIPPED | OUTPATIENT
Start: 2024-12-31 | End: 2025-12-31

## 2024-12-31 RX ORDER — TACROLIMUS 1 MG/G
1 OINTMENT TOPICAL 2 TIMES DAILY
COMMUNITY
Start: 2024-12-20

## 2024-12-31 RX ORDER — ZOLEDRONIC ACID 5 MG/100ML
INJECTION, SOLUTION INTRAVENOUS
COMMUNITY
Start: 2024-10-11

## 2024-12-31 NOTE — PROGRESS NOTES
Subjective:       Patient ID: Raul Rodriguez is a 74 y.o. male     Chief Complaint:  No chief complaint on file.       Allergies:  Ace inhibitors and Arb-angiotensin receptor antagonist    Current Medications:    Outpatient Encounter Medications as of 12/31/2024   Medication Sig Dispense Refill    albuterol (PROVENTIL) 2.5 mg /3 mL (0.083 %) nebulizer solution Inhale 2.5 mg into the lungs.      aspirin 81 MG Chew Take 81 mg by mouth as needed.      baclofen (LIORESAL) 10 MG tablet Take 10 mg by mouth.      budesonide-formoterol 160-4.5 mcg (SYMBICORT) 160-4.5 mcg/actuation HFAA Inhale 2 puffs into the lungs every 12 (twelve) hours. Controller      cyanocobalamin 1,000 mcg/mL injection 1,000 mcg.      DULoxetine (CYMBALTA) 60 MG capsule Take 60 mg by mouth once daily.      EPINEPHrine (EPIPEN JR) 0.15 mg/0.3 mL pen injection Inject 0.15 mg into the muscle as needed for Anaphylaxis.      ergocalciferol (ERGOCALCIFEROL) 50,000 unit Cap       esomeprazole (NEXIUM) 40 MG capsule Take 40 mg by mouth before breakfast.      hydroxychloroquine (PLAQUENIL) 200 mg tablet Take 1 tablet twice a day by oral route.      LIDOcaine HCL 10 mg/ml, 1%, 10 mg/mL (1 %) injection Inject 2 mLs into the articular space.      metoprolol succinate (TOPROL-XL) 50 MG 24 hr tablet Take 50 mg by mouth.      NIFEdipine (ADALAT CC) 30 MG TbSR Take 30 mg by mouth.      NIFEdipine (PROCARDIA-XL) 30 MG (OSM) 24 hr tablet       omalizumab (XOLAIR) 75 mg/0.5 mL injection Inject 75 mg into the skin every 28 days.      oxyCODONE (ROXICODONE) 15 MG Tab Take 15 mg by mouth 3 (three) times daily.      prednisoLONE acetate (PRED FORTE) 1 % DrpS Place 1 drop into the left eye 4 (four) times daily.      tacrolimus (PROTOPIC) 0.1 % ointment 1 application  2 (two) times daily.      tadalafiL (CIALIS) 20 MG Tab Take 10 mg by mouth once daily.      testosterone cypionate (DEPOTESTOTERONE CYPIONATE) 200 mg/mL injection Inject 1 mL every 2 weeks by  intramuscular route.      triamcinolone acetonide (KENALOG-40) 40 mg/mL injection Inject 40 mg into the articular space.      VENTOLIN HFA 90 mcg/actuation inhaler Inhale 2 puffs into the lungs as needed.      zoledronic acid-mannitol & water (RECLAST) 5 mg/100 mL PgBk 5mg iv infusion once a year      zolpidem (AMBIEN) 10 mg Tab Take 5 mg by mouth nightly as needed.      amLODIPine (NORVASC) 10 MG tablet Take 10 mg by mouth. (Patient not taking: Reported on 12/31/2024)      amoxicillin-clavulanate 500-125mg (AUGMENTIN) 500-125 mg Tab Take 1 tablet (500 mg total) by mouth 2 (two) times daily. (Patient not taking: Reported on 12/31/2024) 28 tablet 0    amoxicillin-clavulanate 500-125mg (AUGMENTIN) 500-125 mg Tab Take 1 tablet (500 mg total) by mouth 2 (two) times daily. (Patient not taking: Reported on 12/31/2024) 28 tablet 0    amoxicillin-clavulanate 875-125mg (AUGMENTIN) 875-125 mg per tablet Take 1 tablet by mouth 2 (two) times daily. (Patient not taking: Reported on 12/31/2024) 14 tablet 0    azelastine (ASTELIN) 137 mcg (0.1 %) nasal spray 1 spray (137 mcg total) by Nasal route 2 (two) times daily. 30 mL 1    bisacodyl (DULCOLAX) 5 mg EC tablet Take 5 mg by mouth as needed for Constipation. (Patient not taking: Reported on 12/31/2024)      doxazosin (CARDURA) 2 MG tablet Take 2 mg by mouth. (Patient not taking: Reported on 12/31/2024)      dutasteride (AVODART) 0.5 mg capsule Take 0.5 mg by mouth. (Patient not taking: Reported on 12/31/2024)      eplerenone (INSPRA) 25 MG Tab Take 25 mg by mouth once daily. (Patient not taking: Reported on 12/31/2024)      fentaNYL (DURAGESIC) 12 mcg/hr PT72 Place 1 patch onto the skin. (Patient not taking: Reported on 12/31/2024)      GEMTESA 75 mg Tab  (Patient not taking: Reported on 12/31/2024)      lamoTRIgine (LAMICTAL) 100 MG tablet Take 1 tablet (100 mg total) by mouth 2 (two) times daily. 60 tablet 11    LORazepam (ATIVAN) 1 MG tablet Take 1 tablet 30 minutes prior to  mri and take 1 tablet at time of mri (Patient not taking: Reported on 12/31/2024) 2 tablet 0    naloxone (NARCAN) 4 mg/actuation Spry 1 spray by Nasal route daily as needed. (Patient not taking: Reported on 12/31/2024)      naproxen (NAPROSYN) 500 MG tablet Take 500 mg by mouth 2 (two) times daily with meals. (Patient not taking: Reported on 12/31/2024)      nebulizer and compressor Jaylyn Use as directed.  Dispense nebulizer, tubing and mask/mouth piece. (Patient not taking: Reported on 12/31/2024)      neomycin-polymyxin-hydrocortisone (CORTISPORIN) 3.5-10,000-1 mg/mL-unit/mL-% otic suspension Place 3 drops into the left ear 2 (two) times daily. (Patient not taking: Reported on 12/31/2024) 10 mL 0    nortriptyline (PAMELOR) 50 MG capsule Take 50 mg by mouth every evening. (Patient not taking: Reported on 12/31/2024)      oxybutynin (DITROPAN-XL) 10 MG 24 hr tablet Take 10 mg by mouth. (Patient not taking: Reported on 12/31/2024)      oxybutynin (DITROPAN-XL) 5 MG TR24 TAKE 1 TABLET BY MOUTH DAILY FOR BLADDER CONTROL (Patient not taking: Reported on 12/31/2024)      oxyCODONE-acetaminophen (PERCOCET)  mg per tablet Take 1 tablet every 6 hours by oral route. (Patient not taking: Reported on 12/31/2024)      polyethylene glycol (GAVILYTE-G) 236-22.74-6.74 -5.86 gram suspension  (Patient not taking: Reported on 12/31/2024)      polyethylene glycol (GLYCOLAX) 17 gram PwPk Take by mouth. (Patient not taking: Reported on 12/31/2024)      pregabalin (LYRICA) 50 MG capsule Take 50 mg by mouth. (Patient not taking: Reported on 12/31/2024)      sildenafil (REVATIO) 20 mg Tab Take 20 mg by mouth 2 (two) times a day. (Patient not taking: Reported on 12/31/2024)      sumatriptan (IMITREX) 100 MG tablet Take 100 mg by mouth daily as needed for Migraine. (Patient not taking: Reported on 12/31/2024)      trospium (SANCTURA) 20 mg Tab tablet  (Patient not taking: Reported on 12/31/2024)      [DISCONTINUED] carBAMazepine  "(TEGRETOL XR) 100 MG 12 hr tablet Take 1 tablet (100 mg total) by mouth 2 (two) times daily. (Patient not taking: Reported on 12/31/2024) 60 tablet 11    [DISCONTINUED] lamoTRIgine (LAMICTAL) 25 MG tablet Take 1 tablet daily for 2 weeks, then take 1 tablet twice daily for 2 weeks then take 2 tablets twice daily (Patient not taking: Reported on 12/31/2024) 120 tablet 5     No facility-administered encounter medications on file as of 12/31/2024.       History of Present Illness  73 y/o male following in neurology clinic for chronic left facial pain    Per the EMR, this is long standing.  Records in the EMR indicate this ongoing since at least 2012.  Per prior note of Dr. Cherry in [facility] neurology at [facility] in 2014, patient had already at that time been evaluated here in [facility], and in [place].  He was actually seen here by Dr. Denis in 2014, but this was for rule out of ALS, at that time was following with the neurology at [facility].    Had gamma knife procedure done without benefit, and prior treated with lyrica, neurontin without benefit.  Had been seen in pain treatment locally with Dr. Jensen and had local injections without benefit.  I note in records from [place] in 2018 he actually had MRI brain done there with emphasis on the trigeminal nerves and no abnormalities were noted except the prior gamma knife procedure changes, though there was also reported history of chiari malformation surgery with occipital craniotomy and removal of posterior arch of C1 and C2 due to the chiari malformation.  Apparently he even had a stimulator placed at [place] in 2019 by his report but he had to have it removed because he said it was causing him too much "pull" every time he turned his head.    I did try him a Rx for Tegretol, however his pharmacy would not fill it apparently due to him being on procardia and oxycodone?  He is followed in chronic pain by Dr. Enamorado in [place], [place] for this same complaint, treated for it with " oxycodone and cymbalta, with prior pamelor, lyrica, and neurontin.  Though the incident with tegretol and procardia is not severe, I disucssed then trial with lamictal and currently he is on 50mg bid dosing.  Plan to increase further to 100mg bid.    Review of the records indicates extensive workup and treatment for thsi condition, including facial nerve specialty and specialty procedures.  I feel quite certain he likely was prior treated with tegretol as it is first line treatment for neuralgia but I do not have any clarification it was used.  I did obtain updated MRI brain and cervical spine, which noted no acute findings, did reflect prior surgical intervention with mild stenosis at C4-5 and C5-6, no cord signal abnormalities.           Review of Systems  Review of Systems   Constitutional:  Negative for diaphoresis and fever.   HENT:  Negative for congestion, hearing loss and tinnitus.    Eyes:  Negative for blurred vision, double vision, photophobia, discharge and redness.   Respiratory:  Negative for cough and shortness of breath.    Cardiovascular:  Negative for chest pain.   Gastrointestinal:  Negative for abdominal pain, nausea and vomiting.   Musculoskeletal:  Positive for back pain, falls, myalgias and neck pain. Negative for joint pain.   Skin:  Negative for itching and rash.   Neurological:  Positive for dizziness, tingling, sensory change and headaches. Negative for tremors, speech change, focal weakness, seizures, loss of consciousness and weakness.   Psychiatric/Behavioral:  Negative for depression, hallucinations and memory loss. The patient does not have insomnia.    All other systems reviewed and are negative.     Objective:     NEUROLOGICAL EXAMINATION:     MENTAL STATUS   Oriented to person, place, and time.   Attention: normal. Concentration: normal.   Speech: speech is normal   Level of consciousness: alert  Knowledge: good and consistent with education.   Normal comprehension.     CRANIAL  NERVES     CN II   Visual fields full to confrontation.   Visual acuity: normal  Right visual field deficit: none  Left visual field deficit: none     CN III, IV, VI   Pupils are equal, round, and reactive to light.  Extraocular motions are normal.   Right pupil: Size: 3 mm. Shape: regular. Reactivity: brisk. Consensual response: intact. Accommodation: intact.   Left pupil: Size: 3 mm. Shape: regular. Reactivity: brisk. Consensual response: intact. Accommodation: intact.   CN III: no CN III palsy  CN VI: no CN VI palsy  Nystagmus: none   Diplopia: none  Upgaze: normal  Downgaze: normal  Conjugate gaze: present  Vestibulo-ocular reflex: present    CN V   Facial sensation intact.   Right facial sensation deficit: none  Left facial sensation deficit: none  Right corneal reflex: normal  Left corneal reflex: normal    CN VII   Facial expression full, symmetric.   Right facial weakness: none  Left facial weakness: peripheral  Left taste: normal    CN VIII   CN VIII normal.   Hearing: intact  Right Rinne: AC > BC  Left Rinne: BC > AC  Springer: lateralizes right     CN IX, X   CN IX normal.   CN X normal.   Palate: symmetric    CN XI   CN XI normal.   Right sternocleidomastoid strength: normal  Left sternocleidomastoid strength: normal  Right trapezius strength: normal  Left trapezius strength: normal    CN XII   CN XII normal.   Tongue: not atrophic  Fasciculations: absent  Tongue deviation: none    MOTOR EXAM   Muscle bulk: normal  Overall muscle tone: normal  Right arm tone: normal  Left arm tone: normal  Right arm pronator drift: absent  Left arm pronator drift: absent  Right leg tone: normal  Left leg tone: normal    Strength   Right neck flexion: 5/5  Left neck flexion: 5/5  Right neck extension: 5/5  Left neck extension: 5/5  Right deltoid: 5/5  Left deltoid: 5/5  Right biceps: 5/5  Left biceps: 5/5  Right triceps: 5/5  Left triceps: 5/5  Right wrist flexion: 5/5  Left wrist flexion: 5/5  Right wrist extension:  5/5  Left wrist extension: 5/5  Right interossei: 5/5  Left interossei: 5/5  Right iliopsoas: 5/5  Left iliopsoas: 5/5  Right quadriceps: 5/5  Left quadriceps: 5/5  Right hamstrin/5  Left hamstrin/5  Right anterior tibial: 5/5  Left anterior tibial: 5/5  Right posterior tibial: 5/5  Left posterior tibial: 5/5  Right gastroc: 5/5  Left gastroc: 5/5    REFLEXES     Reflexes   Right brachioradialis: 2+  Left brachioradialis: 2+  Right biceps: 2+  Left biceps: 2+  Right triceps: 2+  Left triceps: 2+  Right patellar: 2+  Left patellar: 2+  Right achilles: 2+  Left achilles: 2+  Right plantar: normal  Left plantar: normal  Right Son: absent  Left Son: absent  Right ankle clonus: absent  Left ankle clonus: absent  Right pendular knee jerk: absent  Left pendular knee jerk: absent    SENSORY EXAM   Light touch normal.   Right arm light touch: normal  Left arm light touch: normal  Right leg light touch: normal  Left leg light touch: normal  Vibration normal.   Right arm vibration: normal  Left arm vibration: normal  Right leg vibration: normal  Left leg vibration: normal  Proprioception normal.   Right arm proprioception: normal  Left arm proprioception: normal  Right leg proprioception: normal  Left leg proprioception: normal  Pinprick normal.   Right arm pinprick: normal  Left arm pinprick: normal  Right leg pinprick: normal  Left leg pinprick: normal  Graphesthesia: normal  Romberg: negative  Stereognosis: normal    GAIT AND COORDINATION     Gait  Gait: normal     Coordination   Finger to nose coordination: normal  Heel to shin coordination: normal  Tandem walking coordination: normal    Tremor   Resting tremor: absent  Intention tremor: absent  Action tremor: absent       Physical Exam  Vitals and nursing note reviewed.   Constitutional:       Appearance: Normal appearance.   HENT:      Head: Normocephalic.      Ears:      Springer exam findings: Lateralizes right.     Right Rinne: AC > BC.     Left Rinne: BC  > AC.  Eyes:      Extraocular Movements: EOM normal.      Pupils: Pupils are equal, round, and reactive to light.   Cardiovascular:      Rate and Rhythm: Normal rate and regular rhythm.   Pulmonary:      Effort: Pulmonary effort is normal.   Musculoskeletal:         General: Normal range of motion.      Cervical back: Normal range of motion and neck supple.   Skin:     General: Skin is warm and dry.   Neurological:      General: No focal deficit present.      Mental Status: He is alert and oriented to person, place, and time.      Cranial Nerves: No cranial nerve deficit.      Sensory: No sensory deficit.      Motor: No weakness.      Coordination: Coordination normal. Finger-Nose-Finger Test, Heel to Shin Test and Romberg Test normal.      Gait: Gait is intact. Gait and tandem walk normal.      Deep Tendon Reflexes: Reflexes normal.      Reflex Scores:       Tricep reflexes are 2+ on the right side and 2+ on the left side.       Bicep reflexes are 2+ on the right side and 2+ on the left side.       Brachioradialis reflexes are 2+ on the right side and 2+ on the left side.       Patellar reflexes are 2+ on the right side and 2+ on the left side.       Achilles reflexes are 2+ on the right side and 2+ on the left side.  Psychiatric:         Mood and Affect: Mood normal.         Speech: Speech normal.         Behavior: Behavior normal.          Assessment:     Problem List Items Addressed This Visit          Neuro    Facial pain - Primary    Relevant Medications    lamoTRIgine (LAMICTAL) 100 MG tablet          Primary Diagnosis and ICD10  Facial pain [R51.9]    Plan:     There are no Patient Instructions on file for this visit.    Medications Discontinued During This Encounter   Medication Reason    lamoTRIgine (LAMICTAL) 25 MG tablet     carBAMazepine (TEGRETOL XR) 100 MG 12 hr tablet        Requested Prescriptions     Signed Prescriptions Disp Refills    lamoTRIgine (LAMICTAL) 100 MG tablet 60 tablet 11     Sig:  Take 1 tablet (100 mg total) by mouth 2 (two) times daily.       No orders of the defined types were placed in this encounter.

## 2025-03-27 ENCOUNTER — PATIENT MESSAGE (OUTPATIENT)
Dept: NEUROLOGY | Facility: CLINIC | Age: 75
End: 2025-03-27
Payer: MEDICARE

## 2025-04-23 DIAGNOSIS — Z96.651 S/P TOTAL KNEE REPLACEMENT, RIGHT: Primary | ICD-10-CM

## 2025-04-25 ENCOUNTER — CLINICAL SUPPORT (OUTPATIENT)
Dept: REHABILITATION | Facility: HOSPITAL | Age: 75
End: 2025-04-25
Payer: MEDICARE

## 2025-04-25 DIAGNOSIS — Z47.1 AFTERCARE FOLLOWING RIGHT KNEE JOINT REPLACEMENT SURGERY: ICD-10-CM

## 2025-04-25 DIAGNOSIS — R26.2 DIFFICULTY WALKING: ICD-10-CM

## 2025-04-25 DIAGNOSIS — Z96.651 PRESENCE OF RIGHT ARTIFICIAL KNEE JOINT: Primary | ICD-10-CM

## 2025-04-25 DIAGNOSIS — Z96.651 AFTERCARE FOLLOWING RIGHT KNEE JOINT REPLACEMENT SURGERY: ICD-10-CM

## 2025-04-25 DIAGNOSIS — R53.1 WEAKNESS: ICD-10-CM

## 2025-04-25 DIAGNOSIS — M25.561 ACUTE PAIN OF RIGHT KNEE: ICD-10-CM

## 2025-04-25 PROCEDURE — 97161 PT EVAL LOW COMPLEX 20 MIN: CPT

## 2025-04-25 PROCEDURE — 97110 THERAPEUTIC EXERCISES: CPT

## 2025-04-25 NOTE — PATIENT INSTRUCTIONS
Access Code: [email or web address]  URL: [email or web address]  Date: 04/25/2025  Prepared by: Christiano Shah    Exercises  - Seated Knee Extension Stretch with Chair  - 5 min hold  - Seated Hamstring Stretch  - 3 reps - 30 sec hold  - Gastroc Stretch on Wall  - 3 reps - 30 sec hold  - Supine Quadricep Sets  - 3 sets - 10 reps  - Small Range Straight Leg Raise  - 3 sets - 10 reps  - Supine Short Arc Quad  - 3 sets - 10 reps

## 2025-04-25 NOTE — PROGRESS NOTES
Outpatient Rehab    Physical Therapy Evaluation    Patient Name: Raul Rodriguez  MRN: [id number]  YOB: 1950  Encounter Date: 4/25/2025    Therapy Diagnosis:   Encounter Diagnoses   Name Primary?    Presence of right artificial knee joint Yes    Aftercare following right knee joint replacement surgery     Acute pain of right knee     Weakness     Difficulty walking      Physician: Naeem Carbone, *    Physician Orders: Eval and Treat  Medical Diagnosis: S/P total knee replacement, right    Visit # / Visits Authorized:  1 / 1  Insurance Authorization Period: 4/25/2025 to 4/23/2026  Date of Evaluation: 4/25/2025  Plan of Care Certification: 4/25/2025 to 05/23/2025     Time In: 0855   Time Out: 0930  Total Time: 35   Total Billable Time: 35    Intake Outcome Measure for FOTO Survey    Therapist reviewed FOTO scores for Raul Rodriguez on 4/25/2025.   FOTO report - see Media section or FOTO account episode details.     Intake Score: 59%         Subjective   History of Present Illness  Raul is a 74 y.o. male who reports to physical therapy with a chief concern of pain in the R knee.     The patient reports a medical diagnosis of R TKA.  Patient reports a surgery of R TKA. Surgery occurred on 02/28/25.         History of Present Condition/Illness: Pt reports having R TKA on 02/28/2025. Is currently walking with single point straight cane that he was using prior to the surgery. Did use walker for a short time after surgery. Had home health PT for about a month after surgery. Chiari Malformation surgery back in 1990's which involved placing shunt for drainage of CSF. Then, in 2018 pt underwent surgery to remove shunt.     Pain     Patient reports a current pain level of 0/10. Pain at best is reported as 0/10. Pain at worst is reported as 4/10.   Clinical Progression (since onset): Stable  Pain Qualities: Aching, Dull  Pain-Relieving Factors: Rest, Sitting  Pain-Aggravating Factors:  Standing, Walking, Sitting         Living Arrangements  Living Situation  Housing: Home independently  Living Arrangements: Alone    Home Setup  Type of Structure: House  Home Access: Level entry          Past Medical History/Physical Systems Review:   Raul Rodriguez  has a past medical history of Aftercare following right knee joint replacement surgery, Arthritis, Asthma, Cancer, Digestive disorder, Hypertension, and Sleep apnea.    Raul Rodriguez  has a past surgical history that includes Knee surgery; Shoulder surgery; Back surgery; Carpal tunnel release; Sinus surgery; Cholecystectomy; Cataract extraction, bilateral; Bladder surgery; Occipital craniectomy; and Myringotomy with insertion of ventilation tube (Left, 1/30/2024).    Raul has a current medication list which includes the following prescription(s): albuterol, amlodipine, amoxicillin-clavulanate 500-125mg, amoxicillin-clavulanate 500-125mg, amoxicillin-clavulanate 875-125mg, aspirin, azelastine, baclofen, bisacodyl, budesonide-formoterol 160-4.5 mcg, cyanocobalamin, doxazosin, duloxetine, dutasteride, epinephrine, eplerenone, ergocalciferol, esomeprazole, fentanyl, gemtesa, hydroxychloroquine, lamotrigine, lidocaine hcl 10 mg/ml (1%), lorazepam, metoprolol succinate, naloxone, naproxen, nebulizer and compressor, neomycin-polymyxin-hydrocortisone, nifedipine, nifedipine, nortriptyline, omalizumab, oxybutynin, oxybutynin, oxycodone, oxycodone-acetaminophen, polyethylene glycol, polyethylene glycol, prednisolone acetate, pregabalin, sildenafil, sumatriptan, tacrolimus, tadalafil, testosterone cypionate, triamcinolone acetonide, trospium, ventolin hfa, zoledronic acid-mannitol & water, and zolpidem.    Review of patient's allergies indicates:   Allergen Reactions    Ace inhibitors     Arb-angiotensin receptor antagonist         Objective       Knee Range of Motion   Right Knee   Active (deg) Passive (deg) Pain   Flexion 125       Extension -9            Left Knee   Active (deg) Passive (deg) Pain   Flexion 140       Extension 0           No lag with R SLR 4/25/2025               Knee Strength   Right Strength Right Pain Left Strength Left  Pain   Flexion (S2) 5   5     Prone Flexion           Extension (L3) 5   5                Hip Special Tests  90/90 Hamstring Test  Positive: Right and Left          Knee Special Tests     Popliteal angle R: 63, popliteal angle L: 49           Gait Analysis  Gait Analysis Details  Single point straight cane on the R side with decreased radha, R lateral lean, deviates to the R when walking, 1-2 losses of balance while walking         Treatment:  Therapeutic Exercise  TE 1: pt education: POC and HEP  TE 2: seated knee ext stretch  TE 3: seated HS stretch  TE 4: standing gastroc stretch at wall  TE 5: quad sets  TE 6: SAQ  TE 7: SLR      Time Entry(in minutes):  PT Evaluation (Low) Time Entry: 27  Therapeutic Exercise Time Entry: 8    Assessment & Plan   Assessment  Raul presents with a condition of Low complexity.   Presentation of Symptoms: Stable  Will Comorbidities Impact Care: Yes  Previous history of Chiari Malformation surgery    Functional Limitations: Activity tolerance, Painful locomotion/ambulation, Sitting tolerance, Squatting, Functional mobility, Gait limitations, Standing tolerance, Range of motion, Increased risk of fall, Maintaining balance  Impairments: Pain with functional activity, Abnormal gait  Personal Factors Affecting Prognosis: Pain    Prognosis: Good  Assessment Details: Pt s/p R TKA has decreased R knee ext but WNL knee flexion. He has gait deficits likely due to history of Chiari Malformation. Attempted to have pt use his single point straight cane on the L side, but his losses of balance and instability worsened. So, I told him to keep the cane in the R hand while at home and about in town and that we will address gait and balance while in PT.     Plan  From a physical therapy perspective,  the patient would benefit from: Skilled Rehab Services    Planned therapy interventions include: Therapeutic exercise, Therapeutic activities, Neuromuscular re-education, Manual therapy, and Gait training.    Planned modalities to include: Cryotherapy (cold pack), Thermotherapy (hot pack), Electrical stimulation - passive/unattended, and Ultrasound.        Visit Frequency: 3 times Per Week for 4 Weeks.       This plan was discussed with Patient.   Discussion participants: Agreed Upon Plan of Care             Patient's spiritual, cultural, and educational needs considered and patient agreeable to plan of care and goals.           Goals:   Active       Long term goals       Pt will be independent with progressive HEP       Start:  04/25/25    Expected End:  05/23/25            Pt will increase FOTO score to greater than or equal to 74 in order to demonstrate an increase in functional ability       Start:  04/25/25    Expected End:  05/23/25            Pt will be able to squat to retrieve object from the floor       Start:  04/25/25    Expected End:  05/23/25            Pt will be able to walk about 200 feet and minimal gait deficits with single point straight cane prior to needing to sit and rest       Start:  04/25/25    Expected End:  05/23/25            Pt will report only min pain in the knee with prolonged sitting       Start:  04/25/25    Expected End:  05/23/25               Short term goals       Pt will increase R knee ext to 0 deg in order to make gait pattern more efficient       Start:  04/25/25    Expected End:  05/09/25            Pt will report only mod pain in the knee with prolonged sitting       Start:  04/25/25    Expected End:  05/09/25            Pt will be able to walk about 150 feet with single point straight cane prior to needing to sit and rest       Start:  04/25/25    Expected End:  05/09/25                Christiano Shah, PT, DPT  4/25/2025

## 2025-04-25 NOTE — LETTER
April 25, 2025  Naeem Carbone NP  [address] [facility] [address]  [place] [address]    To whom it may concern,     The attached plan of care is being sent to you for review and reference.    You may indicate your approval by signing the document electronically, or by faxing/mailing a signed copy of the final page of this document back to the attention of Christiano Shah PT, DPT:         Plan of Care 4/25/25   Effective from: 4/25/2025  Effective to: 5/23/2025    Plan ID: [id number]            Participants as of Finalize on 4/25/2025    Name Type Comments Contact Info    Naeem Carbone NP Referring Provider  [phone]    Christiano Shah PT, MELISSA Physical Therapist         Last Plan Note     Author: Christiano Shah PT, DPT Status: Signed Last edited: 4/25/2025  8:45 AM         Outpatient Rehab    Physical Therapy Evaluation    Patient Name: Raul Rodriguez  MRN: [id number]  YOB: 1950  Encounter Date: 4/25/2025    Therapy Diagnosis:   Encounter Diagnoses   Name Primary?    Presence of right artificial knee joint Yes    Aftercare following right knee joint replacement surgery     Acute pain of right knee     Weakness     Difficulty walking      Physician: Naeem Carbone, *    Physician Orders: Eval and Treat  Medical Diagnosis: S/P total knee replacement, right    Visit # / Visits Authorized:  1 / 1  Insurance Authorization Period: 4/25/2025 to 4/23/2026  Date of Evaluation: 4/25/2025  Plan of Care Certification: 4/25/2025 to 05/23/2025     Time In: 0855   Time Out: 0930  Total Time: 35   Total Billable Time: 35    Intake Outcome Measure for FOTO Survey    Therapist reviewed FOTO scores for Raul Rodriguez on 4/25/2025.   FOTO report - see Media section or FOTO account episode details.     Intake Score: 59%         Subjective   History of Present Illness  Raul is a 74 y.o. male who reports to physical therapy with a chief concern of pain in the R knee.     The patient  reports a medical diagnosis of R TKA.  Patient reports a surgery of R TKA. Surgery occurred on 02/28/25.         History of Present Condition/Illness: Pt reports having R TKA on 02/28/2025. Is currently walking with single point straight cane that he was using prior to the surgery. Did use walker for a short time after surgery. Had home health PT for about a month after surgery. Chiari Malformation surgery back in 1990's which involved placing shunt for drainage of CSF. Then, in 2018 pt underwent surgery to remove shunt.     Pain     Patient reports a current pain level of 0/10. Pain at best is reported as 0/10. Pain at worst is reported as 4/10.   Clinical Progression (since onset): Stable  Pain Qualities: Aching, Dull  Pain-Relieving Factors: Rest, Sitting  Pain-Aggravating Factors: Standing, Walking, Sitting         Living Arrangements  Living Situation  Housing: Home independently  Living Arrangements: Alone    Home Setup  Type of Structure: House  Home Access: Level entry          Past Medical History/Physical Systems Review:   Raul Rodriguez  has a past medical history of Aftercare following right knee joint replacement surgery, Arthritis, Asthma, Cancer, Digestive disorder, Hypertension, and Sleep apnea.    Raul Rodriguez  has a past surgical history that includes Knee surgery; Shoulder surgery; Back surgery; Carpal tunnel release; Sinus surgery; Cholecystectomy; Cataract extraction, bilateral; Bladder surgery; Occipital craniectomy; and Myringotomy with insertion of ventilation tube (Left, 1/30/2024).    Raul has a current medication list which includes the following prescription(s): albuterol, amlodipine, amoxicillin-clavulanate 500-125mg, amoxicillin-clavulanate 500-125mg, amoxicillin-clavulanate 875-125mg, aspirin, azelastine, baclofen, bisacodyl, budesonide-formoterol 160-4.5 mcg, cyanocobalamin, doxazosin, duloxetine, dutasteride, epinephrine, eplerenone, ergocalciferol, esomeprazole,  fentanyl, gemtesa, hydroxychloroquine, lamotrigine, lidocaine hcl 10 mg/ml (1%), lorazepam, metoprolol succinate, naloxone, naproxen, nebulizer and compressor, neomycin-polymyxin-hydrocortisone, nifedipine, nifedipine, nortriptyline, omalizumab, oxybutynin, oxybutynin, oxycodone, oxycodone-acetaminophen, polyethylene glycol, polyethylene glycol, prednisolone acetate, pregabalin, sildenafil, sumatriptan, tacrolimus, tadalafil, testosterone cypionate, triamcinolone acetonide, trospium, ventolin hfa, zoledronic acid-mannitol & water, and zolpidem.    Review of patient's allergies indicates:   Allergen Reactions    Ace inhibitors     Arb-angiotensin receptor antagonist         Objective       Knee Range of Motion   Right Knee   Active (deg) Passive (deg) Pain   Flexion 125       Extension -9           Left Knee   Active (deg) Passive (deg) Pain   Flexion 140       Extension 0           No lag with R SLR 4/25/2025               Knee Strength   Right Strength Right Pain Left Strength Left  Pain   Flexion (S2) 5   5     Prone Flexion           Extension (L3) 5   5                Hip Special Tests  90/90 Hamstring Test  Positive: Right and Left          Knee Special Tests     Popliteal angle R: 63, popliteal angle L: 49           Gait Analysis  Gait Analysis Details  Single point straight cane on the R side with decreased radha, R lateral lean, deviates to the R when walking, 1-2 losses of balance while walking         Treatment:  Therapeutic Exercise  TE 1: pt education: POC and HEP  TE 2: seated knee ext stretch  TE 3: seated HS stretch  TE 4: standing gastroc stretch at wall  TE 5: quad sets  TE 6: SAQ  TE 7: SLR      Time Entry(in minutes):  PT Evaluation (Low) Time Entry: 27  Therapeutic Exercise Time Entry: 8    Assessment & Plan   Assessment  Raul presents with a condition of Low complexity.   Presentation of Symptoms: Stable  Will Comorbidities Impact Care: Yes  Previous history of Chiari Malformation  surgery    Functional Limitations: Activity tolerance, Painful locomotion/ambulation, Sitting tolerance, Squatting, Functional mobility, Gait limitations, Standing tolerance, Range of motion, Increased risk of fall, Maintaining balance  Impairments: Pain with functional activity, Abnormal gait  Personal Factors Affecting Prognosis: Pain    Prognosis: Good  Assessment Details: Pt s/p R TKA has decreased R knee ext but WNL knee flexion. He has gait deficits likely due to history of Chiari Malformation. Attempted to have pt use his single point straight cane on the L side, but his losses of balance and instability worsened. So, I told him to keep the cane in the R hand while at home and about in town and that we will address gait and balance while in PT.     Plan  From a physical therapy perspective, the patient would benefit from: Skilled Rehab Services    Planned therapy interventions include: Therapeutic exercise, Therapeutic activities, Neuromuscular re-education, Manual therapy, and Gait training.    Planned modalities to include: Cryotherapy (cold pack), Thermotherapy (hot pack), Electrical stimulation - passive/unattended, and Ultrasound.        Visit Frequency: 3 times Per Week for 4 Weeks.       This plan was discussed with Patient.   Discussion participants: Agreed Upon Plan of Care             Patient's spiritual, cultural, and educational needs considered and patient agreeable to plan of care and goals.           Goals:   Active       Long term goals       Pt will be independent with progressive HEP       Start:  04/25/25    Expected End:  05/23/25            Pt will increase FOTO score to greater than or equal to 74 in order to demonstrate an increase in functional ability       Start:  04/25/25    Expected End:  05/23/25            Pt will be able to squat to retrieve object from the floor       Start:  04/25/25    Expected End:  05/23/25            Pt will be able to walk about 200 feet and minimal gait  deficits with single point straight cane prior to needing to sit and rest       Start:  04/25/25    Expected End:  05/23/25            Pt will report only min pain in the knee with prolonged sitting       Start:  04/25/25    Expected End:  05/23/25               Short term goals       Pt will increase R knee ext to 0 deg in order to make gait pattern more efficient       Start:  04/25/25    Expected End:  05/09/25            Pt will report only mod pain in the knee with prolonged sitting       Start:  04/25/25    Expected End:  05/09/25            Pt will be able to walk about 150 feet with single point straight cane prior to needing to sit and rest       Start:  04/25/25    Expected End:  05/09/25                Christiano Shah PT, DPT  4/25/2025         Current Participants as of 4/25/2025    Name Type Comments Contact Info    Naeem Carbone NP Referring Provider  [phone]    Signature pending    Christiano Shah PT, DPT Physical Therapist      Signature pending            Sincerely,      Christiano Shah PT, DPT  [facility]                                                            Dear Christiano Shah PT, DPT,    RE: Mr. Raul Rodriguez, MRN: [id number]    I certify that I have reviewed the attached plan of care and agree to the details within.        ___________________________  ___________________________  Provider Printed Name   Provider Signed Name      ___________________________  Date and Time

## 2025-04-28 ENCOUNTER — CLINICAL SUPPORT (OUTPATIENT)
Dept: REHABILITATION | Facility: HOSPITAL | Age: 75
End: 2025-04-28
Payer: MEDICARE

## 2025-04-28 DIAGNOSIS — R26.2 DIFFICULTY WALKING: ICD-10-CM

## 2025-04-28 DIAGNOSIS — R53.1 WEAKNESS: ICD-10-CM

## 2025-04-28 DIAGNOSIS — Z96.651 AFTERCARE FOLLOWING RIGHT KNEE JOINT REPLACEMENT SURGERY: ICD-10-CM

## 2025-04-28 DIAGNOSIS — Z47.1 AFTERCARE FOLLOWING RIGHT KNEE JOINT REPLACEMENT SURGERY: ICD-10-CM

## 2025-04-28 DIAGNOSIS — Z96.651 PRESENCE OF RIGHT ARTIFICIAL KNEE JOINT: Primary | ICD-10-CM

## 2025-04-28 DIAGNOSIS — Z96.651 S/P TOTAL KNEE REPLACEMENT, RIGHT: ICD-10-CM

## 2025-04-28 DIAGNOSIS — M25.561 ACUTE PAIN OF RIGHT KNEE: ICD-10-CM

## 2025-04-28 PROCEDURE — 97112 NEUROMUSCULAR REEDUCATION: CPT | Mod: CQ

## 2025-04-28 PROCEDURE — 97110 THERAPEUTIC EXERCISES: CPT | Mod: CQ

## 2025-04-28 NOTE — PROGRESS NOTES
"  Outpatient Rehab    Physical Therapy Visit    Patient Name: Raul Rodriguez  MRN: [id number]  YOB: 1950  Encounter Date: 4/28/2025    Therapy Diagnosis: No diagnosis found.  Physician: Naeem Carbone, *    Physician Orders: Eval and Treat  Medical Diagnosis: S/P total knee replacement, right    Visit # / Visits Authorized:  2 / 12  Insurance Authorization Period: 4/25/2025 to 4/22/2027  Date of Evaluation: 4/25/2025  Plan of Care Certification: 4/25/2025 to 5/23/2025     PT/PTA: PTA   Number of PTA visits since last PT visit:1  Time In: 1232   Time Out: 1310  Total Time: 38   Total Billable Time: 38    FOTO:  Intake Score:  %  Survey Score 1:  %  Survey Score 2:  %         Subjective   Patient's chief complaint if feeling stiffness in Right knee.  Patient arrives to PT treatment session ambulating with cane..    R knee    Objective            Treatment:  Therapeutic Exercise  TE 1: Nustep x 8 minutes  TE 2: wedge Hamstring stretch 2 minutes  TE 3: SEated Hamstring stretch 3 x 20"    Balance/Neuromuscular Re-Education  NMR 1: quad sets x 30 reps  NMR 2: SLRs 2 x 10 supine  NMR 3: LAQs  2 x 10, 3#  NMR 4: Sidelying hip abduction 2 x 10  NMR 5: Hip adduction with ball isometric 30 x 3"  NMR 6: Hamstring curls 3 x 10, red tband    Time Entry(in minutes):  Neuromuscular Re-Education Time Entry: 24  Therapeutic Exercise Time Entry: 14    Assessment & Plan   Assessment: Patient requires mod cues to progress through completion of PT treatment session with proper alignment, speed of movement, count, and hold times.  Right knee AROM flexion 124 degrees and Right knee extension AROM -8 degrees at end of PT treatment session with patient supine.  No adverse effects noted.       Patient will continue to benefit from skilled outpatient physical therapy to address the deficits listed in the problem list box on initial evaluation, provide pt/family education and to maximize pt's level of independence in " the home and community environment.     Patient's spiritual, cultural, and educational needs considered and patient agreeable to plan of care and goals.     Education  Education was done with Patient. The patient's learning style includes Demonstration and Listening. The patient Demonstrates understanding and Verbalizes understanding.         Plan of Care;Home exercise program; Delayed onset muscle soreness        Plan: Continue per POC and progress as pt able    Goals:   Active       Long term goals       Pt will be independent with progressive HEP       Start:  04/25/25    Expected End:  05/23/25            Pt will increase FOTO score to greater than or equal to 74 in order to demonstrate an increase in functional ability       Start:  04/25/25    Expected End:  05/23/25            Pt will be able to squat to retrieve object from the floor       Start:  04/25/25    Expected End:  05/23/25            Pt will be able to walk about 200 feet and minimal gait deficits with single point straight cane prior to needing to sit and rest       Start:  04/25/25    Expected End:  05/23/25            Pt will report only min pain in the knee with prolonged sitting       Start:  04/25/25    Expected End:  05/23/25               Short term goals       Pt will increase R knee ext to 0 deg in order to make gait pattern more efficient       Start:  04/25/25    Expected End:  05/09/25            Pt will report only mod pain in the knee with prolonged sitting       Start:  04/25/25    Expected End:  05/09/25            Pt will be able to walk about 150 feet with single point straight cane prior to needing to sit and rest       Start:  04/25/25    Expected End:  05/09/25                Miya Samaniego, PTA 4/30/2025

## 2025-04-30 ENCOUNTER — CLINICAL SUPPORT (OUTPATIENT)
Dept: REHABILITATION | Facility: HOSPITAL | Age: 75
End: 2025-04-30
Payer: MEDICARE

## 2025-04-30 DIAGNOSIS — R53.1 WEAKNESS: ICD-10-CM

## 2025-04-30 DIAGNOSIS — Z96.651 PRESENCE OF RIGHT ARTIFICIAL KNEE JOINT: Primary | ICD-10-CM

## 2025-04-30 DIAGNOSIS — Z96.651 AFTERCARE FOLLOWING RIGHT KNEE JOINT REPLACEMENT SURGERY: ICD-10-CM

## 2025-04-30 DIAGNOSIS — Z96.651 S/P TOTAL KNEE REPLACEMENT, RIGHT: ICD-10-CM

## 2025-04-30 DIAGNOSIS — M25.561 ACUTE PAIN OF RIGHT KNEE: ICD-10-CM

## 2025-04-30 DIAGNOSIS — Z47.1 AFTERCARE FOLLOWING RIGHT KNEE JOINT REPLACEMENT SURGERY: ICD-10-CM

## 2025-04-30 PROCEDURE — 97010 HOT OR COLD PACKS THERAPY: CPT | Mod: CQ

## 2025-04-30 PROCEDURE — 97110 THERAPEUTIC EXERCISES: CPT | Mod: CQ

## 2025-04-30 PROCEDURE — 97112 NEUROMUSCULAR REEDUCATION: CPT | Mod: CQ

## 2025-04-30 PROCEDURE — 97530 THERAPEUTIC ACTIVITIES: CPT | Mod: CQ

## 2025-04-30 NOTE — PROGRESS NOTES
"  Outpatient Rehab    Physical Therapy Visit    Patient Name: Raul Rodriguez  MRN: [id number]  YOB: 1950  Encounter Date: 4/30/2025    Therapy Diagnosis:   Encounter Diagnoses   Name Primary?    Presence of right artificial knee joint Yes    Aftercare following right knee joint replacement surgery     Acute pain of right knee     Weakness     S/P total knee replacement, right      Physician: Naeem Carbone, *    Physician Orders: Eval and Treat  Medical Diagnosis: S/P total knee replacement, right    Visit # / Visits Authorized:  3 / 12  Insurance Authorization Period: 4/25/2025 to 4/23/2026  Date of Evaluation: 4/25/2025  Plan of Care Certification: 4/25/2025 to 05/23/2025      PT/PTA:     Number of PTA visits since last PT visit: 2/5  Time In: 1405   Time Out: 1455  Total Time: 50   Total Billable Time: 45    FOTO:  Intake Score:  %  Survey Score 1:  %  Survey Score 2:  %         Subjective   stiff and sore after riding to [place] and back this morning; pain as noted; pain meds taken this morning.  Pain reported as 4/10. R knee    Objective       Knee Range of Motion   Right Knee   Active (deg) Passive (deg) Pain   Flexion 115       Extension -5           5 degrees quad lag w/ straight leg raise          Treatment:  Therapeutic Exercise  TE 1: Nustep x 5 minutes  TE 2: sitting marching x 20 reps  TE 3: standing HS stretch, 3x20 s/h  TE 4: wedge B calf stretch x 2 min  TE 5: sitting ankle rocking x 20 reps  TE 6: sitting hip abd x 20 reps w/ blue band  TE 7: sitting hip adduction isometrics x 20 reps  TE 8: towel slides in sitting for knee flex x 20 reps  Balance/Neuromuscular Re-Education  NMR 1: quad sets x 20 reps  NMR 2: SLRs x 20 reps  NMR 3: LAQs x 20 reps  NMR 4: standing BLE ex x 15 reps each: marching, heel/toe raises, hip abd, hip ext  Therapeutic Activity  TA 1: sit to stand 2x5 reps  TA 2: 4" step ups x 10 reps each fwd/lateral  TA 3: mini squats x 10 " reps  Modalities  Cryotherapy (Minutes\Location): 5 min to R knee    Time Entry(in minutes):  Hot/Cold Pack Time Entry: 5 (R knee)  Neuromuscular Re-Education Time Entry: 15  Therapeutic Activity Time Entry: 10  Therapeutic Exercise Time Entry: 20    Assessment & Plan   Assessment: reports feeling some better after session; pt w/ some loss of flexion ROM today most likely to being in the car most of the day; improved ext ROM  Evaluation/Treatment Tolerance: Patient tolerated treatment well    Patient will continue to benefit from skilled outpatient physical therapy to address the deficits listed in the problem list box on initial evaluation, provide pt/family education and to maximize pt's level of independence in the home and community environment.    Education  Education was done with Patient. The patient's learning style includes Demonstration and Listening. The patient Demonstrates understanding and Verbalizes understanding.         Edu pt in proper gait technique w/ focus on terminal knee extension w/ heel strike and to avoid dragging or shuffling feet       Plan: Continue per POC and progress as pt able    Goals:   Active       Long term goals       Pt will be independent with progressive HEP       Start:  04/25/25    Expected End:  05/23/25            Pt will increase FOTO score to greater than or equal to 74 in order to demonstrate an increase in functional ability       Start:  04/25/25    Expected End:  05/23/25            Pt will be able to squat to retrieve object from the floor       Start:  04/25/25    Expected End:  05/23/25            Pt will be able to walk about 200 feet and minimal gait deficits with single point straight cane prior to needing to sit and rest       Start:  04/25/25    Expected End:  05/23/25            Pt will report only min pain in the knee with prolonged sitting       Start:  04/25/25    Expected End:  05/23/25               Short term goals       Pt will increase R knee ext to  0 deg in order to make gait pattern more efficient       Start:  04/25/25    Expected End:  05/09/25            Pt will report only mod pain in the knee with prolonged sitting       Start:  04/25/25    Expected End:  05/09/25            Pt will be able to walk about 150 feet with single point straight cane prior to needing to sit and rest       Start:  04/25/25    Expected End:  05/09/25                Sandee Anguiano, PTA

## 2025-05-02 ENCOUNTER — CLINICAL SUPPORT (OUTPATIENT)
Dept: REHABILITATION | Facility: HOSPITAL | Age: 75
End: 2025-05-02
Payer: MEDICARE

## 2025-05-02 DIAGNOSIS — R53.1 WEAKNESS: ICD-10-CM

## 2025-05-02 DIAGNOSIS — Z47.1 AFTERCARE FOLLOWING RIGHT KNEE JOINT REPLACEMENT SURGERY: ICD-10-CM

## 2025-05-02 DIAGNOSIS — M25.561 ACUTE PAIN OF RIGHT KNEE: ICD-10-CM

## 2025-05-02 DIAGNOSIS — R26.2 DIFFICULTY WALKING: ICD-10-CM

## 2025-05-02 DIAGNOSIS — Z96.651 PRESENCE OF RIGHT ARTIFICIAL KNEE JOINT: Primary | ICD-10-CM

## 2025-05-02 DIAGNOSIS — Z96.651 AFTERCARE FOLLOWING RIGHT KNEE JOINT REPLACEMENT SURGERY: ICD-10-CM

## 2025-05-02 PROCEDURE — 97530 THERAPEUTIC ACTIVITIES: CPT

## 2025-05-02 PROCEDURE — 97112 NEUROMUSCULAR REEDUCATION: CPT

## 2025-05-02 PROCEDURE — 97110 THERAPEUTIC EXERCISES: CPT

## 2025-05-02 NOTE — PROGRESS NOTES
"  Outpatient Rehab    Physical Therapy Visit    Patient Name: Ralu Rodriguez  MRN: [id number]  YOB: 1950  Encounter Date: 5/2/2025    Therapy Diagnosis:   Encounter Diagnoses   Name Primary?    Presence of right artificial knee joint Yes    Aftercare following right knee joint replacement surgery     Acute pain of right knee     Weakness     Difficulty walking      Physician: Naeem Carbone, *    Physician Orders: Eval and Treat  Medical Diagnosis: S/P total knee replacement, right    Visit # / Visits Authorized:  4 / 12  Insurance Authorization Period: 4/25/2025 to 4/23/2026  Date of Evaluation: 4/25/2025  Plan of Care Certification: 4/25/2025 to 05/23/2025     PT/PTA: PT   Number of PTA visits since last PT visit:0  Time In: 1017   Time Out: 1100  Total Time: 43   Total Billable Time: 43    FOTO:  Intake Score:  %  Survey Score 1:  %  Survey Score 2:  %         Subjective   Pt reports soreness in the R knee today after his session earlier in the week and after doing some of his HEP. He says that he had difficulty walking yesterday but that it is better today..         Objective       Knee Range of Motion   Right Knee   Active (deg) Passive (deg) Pain   Flexion 115       Extension -5           No lag with supine R SLR 5/2/2025            Treatment:  Therapeutic Exercise  TE 1: Nustep x 8 minutes  TE 2: wedge gastroc stretch 2 minutes  TE 3: seated Hamstring stretch 3 x 30"  TE 4: supine HS curls: 20  Balance/Neuromuscular Re-Education  NMR 1: quad sets: 20 x 3 sec hold, heel propped up on noodle  NMR 2: SLRs 2 x 10 supine  NMR 3: SAQ: 20 x 3 sec hold  NMR 4: long bridge on swiss: 2 x 10  Therapeutic Activity  TA 1: 6-inch step ups, forward: 20  TA 2: 6-inch step ups, lateral: 20  TA 3: mini squats: 2 x 10  TA 4: 4-inch step downs, forward: 2 x 10    Time Entry(in minutes):  Neuromuscular Re-Education Time Entry: 17  Therapeutic Activity Time Entry: 16  Therapeutic Exercise Time Entry: " 10    Assessment & Plan   Assessment: Pt tolerating PT well so far. He is still lacking some R knee ext AROM and minimal R knee flex AROM. Today, he is able to tolerate continuation of exercises/activities from previous treatment session. Increased reps on functional strengthening activities for added strengthening to the LE's. Will continue to progress as able and as tolerated in coming visits.       Patient will continue to benefit from skilled outpatient physical therapy to address the deficits listed in the problem list box on initial evaluation, provide pt/family education and to maximize pt's level of independence in the home and community environment.     Patient's spiritual, cultural, and educational needs considered and patient agreeable to plan of care and goals.           Plan: Continue per POC and progress as pt able    Goals:   Active       Long term goals       Pt will be independent with progressive HEP       Start:  04/25/25    Expected End:  05/23/25            Pt will increase FOTO score to greater than or equal to 74 in order to demonstrate an increase in functional ability       Start:  04/25/25    Expected End:  05/23/25            Pt will be able to squat to retrieve object from the floor       Start:  04/25/25    Expected End:  05/23/25            Pt will be able to walk about 200 feet and minimal gait deficits with single point straight cane prior to needing to sit and rest       Start:  04/25/25    Expected End:  05/23/25            Pt will report only min pain in the knee with prolonged sitting       Start:  04/25/25    Expected End:  05/23/25               Short term goals       Pt will increase R knee ext to 0 deg in order to make gait pattern more efficient       Start:  04/25/25    Expected End:  05/09/25            Pt will report only mod pain in the knee with prolonged sitting       Start:  04/25/25    Expected End:  05/09/25            Pt will be able to walk about 150 feet with  single point straight cane prior to needing to sit and rest       Start:  04/25/25    Expected End:  05/09/25                Christiano Shah PT, DPT  5/2/2025

## 2025-05-05 ENCOUNTER — CLINICAL SUPPORT (OUTPATIENT)
Dept: REHABILITATION | Facility: HOSPITAL | Age: 75
End: 2025-05-05
Payer: MEDICARE

## 2025-05-05 DIAGNOSIS — R53.1 WEAKNESS: ICD-10-CM

## 2025-05-05 DIAGNOSIS — M25.561 ACUTE PAIN OF RIGHT KNEE: ICD-10-CM

## 2025-05-05 DIAGNOSIS — Z96.651 PRESENCE OF RIGHT ARTIFICIAL KNEE JOINT: Primary | ICD-10-CM

## 2025-05-05 DIAGNOSIS — Z96.651 AFTERCARE FOLLOWING RIGHT KNEE JOINT REPLACEMENT SURGERY: ICD-10-CM

## 2025-05-05 DIAGNOSIS — R26.2 DIFFICULTY WALKING: ICD-10-CM

## 2025-05-05 DIAGNOSIS — Z47.1 AFTERCARE FOLLOWING RIGHT KNEE JOINT REPLACEMENT SURGERY: ICD-10-CM

## 2025-05-05 PROCEDURE — 97110 THERAPEUTIC EXERCISES: CPT | Mod: CQ

## 2025-05-05 PROCEDURE — 97530 THERAPEUTIC ACTIVITIES: CPT | Mod: CQ

## 2025-05-05 PROCEDURE — 97112 NEUROMUSCULAR REEDUCATION: CPT | Mod: CQ

## 2025-05-05 NOTE — PROGRESS NOTES
"  Outpatient Rehab    Physical Therapy Visit    Patient Name: Raul Rodriguez  MRN: [id number]  YOB: 1950  Encounter Date: 5/5/2025    Therapy Diagnosis:   Encounter Diagnoses   Name Primary?    Presence of right artificial knee joint Yes    Aftercare following right knee joint replacement surgery     Acute pain of right knee     Weakness     Difficulty walking      Physician: Naeem Carbone, *    Physician Orders: Eval and Treat  Medical Diagnosis: S/P total knee replacement, right    Visit # / Visits Authorized:  5 / 12  Insurance Authorization Period: 4/25/2025 to 4/23/2026  Date of Evaluation: 4/25/2025  Plan of Care Certification: 4/25/2025 to 05/23/2025  FOTO due next session     PT/PTA:     Number of PTA visits since last PT visit: 1/5  Time In: 0847   Time Out: 0935  Total Time (in minutes): 48   Total Billable Time (in minutes): 43    FOTO:  Intake Score:  %  Survey Score 2:  %  Survey Score 3:  %         Subjective   pain in knee as noted.  Pain reported as 4/10. R knee    Objective       Knee Range of Motion   Right Knee   Active (deg) Passive (deg) Pain   Flexion 120       Extension -5           10 degrees quad lag w/ straight leg raise in long sitting            Treatment:  Therapeutic Exercise  TE 1: Nustep x 8 minutes  TE 2: wedge gastroc stretch 2 minutes  TE 3: seated Hamstring stretch 3 x 30"  TE 4: sitting HS curls: 20 (not today)  TE 5: sitting BLE: marching and ankle rocking x 20 reps each  TE 6: sitting hip abd x 20 reps w/ blue band  TE 7: .  TE 8: towel slides in sitting for knee flex x 20 reps  Balance/Neuromuscular Re-Education  NMR 1: long sitting quad sets: 20 x 3 sec hold, heel propped up on foam mat  NMR 2: SLRs 2 x 10 in long sit w/ strap for assist  NMR 3: SAQ: 20 x 3 sec hold (not today)  NMR 4: LAQ x 20 reps w/ focus on TKE  NMR 5: .  NMR 6: .  Therapeutic Activity  TA 1: 6-inch step ups, forward: 20  TA 2: 6-inch step ups, lateral: 20  TA 3: mini squats: 2 " x 10  TA 4: 4-inch step downs, forward: 2 x 10 (not today)  TA 5: dynamic activities w/ focus on TKE (~15ft each): toe walking, heel walking, side stepping L and R, vertical and horizontal head nods w/ UE support; along w/ focus on TKE w/ heel strike w/ forward walking    Time Entry(in minutes):  Neuromuscular Re-Education Time Entry: 10  Therapeutic Activity Time Entry: 15  Therapeutic Exercise Time Entry: 18    Assessment & Plan   Assessment: no focus on HS strengthening today due to co-contraction of HS w/ QS and SLR; pt aware to work diligently on TKE for ROM and strengthenining; improved gait w/ focus on TKE w/ heel strike; pt unable to use cane on opposite side  Evaluation/Treatment Tolerance: Patient tolerated treatment well    Patient will continue to benefit from skilled outpatient physical therapy to address the deficits listed in the problem list box on initial evaluation, provide pt/family education and to maximize pt's level of independence in the home and community environment.           Plan: Continue per POC and progress as pt able    Goals:   Active       Long term goals       Pt will be independent with progressive HEP       Start:  04/25/25    Expected End:  05/23/25            Pt will increase FOTO score to greater than or equal to 74 in order to demonstrate an increase in functional ability       Start:  04/25/25    Expected End:  05/23/25            Pt will be able to squat to retrieve object from the floor       Start:  04/25/25    Expected End:  05/23/25            Pt will be able to walk about 200 feet and minimal gait deficits with single point straight cane prior to needing to sit and rest       Start:  04/25/25    Expected End:  05/23/25            Pt will report only min pain in the knee with prolonged sitting       Start:  04/25/25    Expected End:  05/23/25               Short term goals       Pt will increase R knee ext to 0 deg in order to make gait pattern more efficient        Start:  04/25/25    Expected End:  05/09/25            Pt will report only mod pain in the knee with prolonged sitting       Start:  04/25/25    Expected End:  05/09/25            Pt will be able to walk about 150 feet with single point straight cane prior to needing to sit and rest       Start:  04/25/25    Expected End:  05/09/25                Sandee Anguiano, PTA

## 2025-05-08 ENCOUNTER — CLINICAL SUPPORT (OUTPATIENT)
Dept: REHABILITATION | Facility: HOSPITAL | Age: 75
End: 2025-05-08
Payer: MEDICARE

## 2025-05-08 DIAGNOSIS — Z96.651 AFTERCARE FOLLOWING RIGHT KNEE JOINT REPLACEMENT SURGERY: ICD-10-CM

## 2025-05-08 DIAGNOSIS — R26.2 DIFFICULTY WALKING: ICD-10-CM

## 2025-05-08 DIAGNOSIS — Z47.1 AFTERCARE FOLLOWING RIGHT KNEE JOINT REPLACEMENT SURGERY: ICD-10-CM

## 2025-05-08 DIAGNOSIS — R53.1 WEAKNESS: ICD-10-CM

## 2025-05-08 DIAGNOSIS — Z96.651 PRESENCE OF RIGHT ARTIFICIAL KNEE JOINT: Primary | ICD-10-CM

## 2025-05-08 DIAGNOSIS — M25.561 ACUTE PAIN OF RIGHT KNEE: ICD-10-CM

## 2025-05-08 PROCEDURE — 97110 THERAPEUTIC EXERCISES: CPT | Mod: CQ

## 2025-05-08 PROCEDURE — 97112 NEUROMUSCULAR REEDUCATION: CPT | Mod: CQ

## 2025-05-08 PROCEDURE — 97530 THERAPEUTIC ACTIVITIES: CPT | Mod: CQ

## 2025-05-08 PROCEDURE — 97010 HOT OR COLD PACKS THERAPY: CPT | Mod: CQ

## 2025-05-08 NOTE — PROGRESS NOTES
"  Outpatient Rehab    Physical Therapy Visit    Patient Name: Raul Rodriguez  MRN: [id number]  YOB: 1950  Encounter Date: 5/8/2025    Therapy Diagnosis:   Encounter Diagnoses   Name Primary?    Presence of right artificial knee joint Yes    Aftercare following right knee joint replacement surgery     Acute pain of right knee     Weakness     Difficulty walking      Physician: Naeem Carbone, *    Physician Orders: Eval and Treat  Medical Diagnosis: S/P total knee replacement, right    Visit # / Visits Authorized:  6 / 12  Insurance Authorization Period: 4/25/2025 to 4/23/2026  Date of Evaluation: 4/25/2025  Plan of Care Certification: 4/25/2025 to 05/23/2025  FOTO due next session     PT/PTA:     Number of PTA visits since last PT visit: 2/5  Time In: 0930   Time Out: 1020  Total Time (in minutes): 50   Total Billable Time (in minutes): 45    FOTO:  Intake Score:  %  Survey Score 2:  %  Survey Score 3:  %         Subjective   pain in R knee as noted; has taken pain meds today.  Pain reported as 4/10. R knee    Objective       Knee Range of Motion   Right Knee   Active (deg) Passive (deg) Pain   Flexion 120       Extension -2           4-5 degrees quad lag w/ straight leg raise            Treatment:  Therapeutic Exercise  TE 1: Nustep x 8 minutes  TE 2: wedge gastroc stretch 2 minutes  TE 3: seated Hamstring stretch 3 x 30"  TE 4: sitting HS curls: 20  TE 5: sitting ankle rocking x 20 reps  TE 6: sitting hip abd x 20 reps w/ blue band  TE 7: .  TE 8: towel slides in sitting for knee flex x 20 reps  Balance/Neuromuscular Re-Education  NMR 1: long sitting quad sets: 20 x 3 sec hold, heel propped up on foam mat  NMR 2: SLRs 2 x 10 in long sit w/ strap for assist  NMR 3: SAQ: 20 x 3 sec hold (not today)  NMR 4: .  NMR 5: .  NMR 6: .  Therapeutic Activity  TA 1: 6-inch step ups, forward: 20  TA 2: 6-inch step ups, lateral: 20  TA 3: mini squats: 2 x 10  TA 4: 4-inch step downs, forward: 2 x 10 " (not today)    Time Entry(in minutes):  Hot/Cold Pack Time Entry: 5  Neuromuscular Re-Education Time Entry: 15  Therapeutic Activity Time Entry: 15  Therapeutic Exercise Time Entry: 15    Assessment & Plan   Assessment: improving ROM and strength as noted  Evaluation/Treatment Tolerance: Patient tolerated treatment well    Patient will continue to benefit from skilled outpatient physical therapy to address the deficits listed in the problem list box on initial evaluation, provide pt/family education and to maximize pt's level of independence in the home and community environment.          Plan: Continue per POC and progress as pt able    Goals:   Active       Long term goals       Pt will be independent with progressive HEP       Start:  04/25/25    Expected End:  05/23/25            Pt will increase FOTO score to greater than or equal to 74 in order to demonstrate an increase in functional ability       Start:  04/25/25    Expected End:  05/23/25            Pt will be able to squat to retrieve object from the floor       Start:  04/25/25    Expected End:  05/23/25            Pt will be able to walk about 200 feet and minimal gait deficits with single point straight cane prior to needing to sit and rest       Start:  04/25/25    Expected End:  05/23/25            Pt will report only min pain in the knee with prolonged sitting       Start:  04/25/25    Expected End:  05/23/25               Short term goals       Pt will increase R knee ext to 0 deg in order to make gait pattern more efficient       Start:  04/25/25    Expected End:  05/09/25            Pt will report only mod pain in the knee with prolonged sitting       Start:  04/25/25    Expected End:  05/09/25            Pt will be able to walk about 150 feet with single point straight cane prior to needing to sit and rest       Start:  04/25/25    Expected End:  05/09/25                Sandee Anguiano, PTA

## 2025-05-12 ENCOUNTER — CLINICAL SUPPORT (OUTPATIENT)
Dept: REHABILITATION | Facility: HOSPITAL | Age: 75
End: 2025-05-12
Payer: MEDICARE

## 2025-05-12 DIAGNOSIS — M25.561 ACUTE PAIN OF RIGHT KNEE: ICD-10-CM

## 2025-05-12 DIAGNOSIS — R26.2 DIFFICULTY WALKING: ICD-10-CM

## 2025-05-12 DIAGNOSIS — Z47.1 AFTERCARE FOLLOWING RIGHT KNEE JOINT REPLACEMENT SURGERY: ICD-10-CM

## 2025-05-12 DIAGNOSIS — Z96.651 PRESENCE OF RIGHT ARTIFICIAL KNEE JOINT: Primary | ICD-10-CM

## 2025-05-12 DIAGNOSIS — R53.1 WEAKNESS: ICD-10-CM

## 2025-05-12 DIAGNOSIS — Z96.651 AFTERCARE FOLLOWING RIGHT KNEE JOINT REPLACEMENT SURGERY: ICD-10-CM

## 2025-05-12 PROCEDURE — 97010 HOT OR COLD PACKS THERAPY: CPT | Mod: CQ

## 2025-05-12 PROCEDURE — 97112 NEUROMUSCULAR REEDUCATION: CPT | Mod: CQ

## 2025-05-12 PROCEDURE — 97110 THERAPEUTIC EXERCISES: CPT | Mod: CQ

## 2025-05-12 PROCEDURE — 97530 THERAPEUTIC ACTIVITIES: CPT | Mod: CQ

## 2025-05-12 NOTE — PROGRESS NOTES
"  Outpatient Rehab    Physical Therapy Visit    Patient Name: Raul Rodriguez  MRN: [id number]  YOB: 1950  Encounter Date: 5/12/2025    Therapy Diagnosis:   Encounter Diagnoses   Name Primary?    Presence of right artificial knee joint Yes    Aftercare following right knee joint replacement surgery     Acute pain of right knee     Weakness     Difficulty walking      Physician: Naeem Carbone, *    Physician Orders: Eval and Treat  Medical Diagnosis: S/P total knee replacement, right    Visit # / Visits Authorized:  7 / 12  Insurance Authorization Period: 4/25/2025 to 4/23/2026  Date of Evaluation: 4/25/2025  Plan of Care Certification: 4/25/2025 to 05/23/2025  FOTO due: last session     PT/PTA:     Number of PTA visits since last PT visit: 3/5  Time In: 0933   Time Out: 1028  Total Time (in minutes): 55   Total Billable Time (in minutes): 47    FOTO:  Intake Score:  %  Survey Score 2:  %  Survey Score 3:  %    Precautions:       Subjective   no pain on arrival; but reports pain is at night.  Pain reported as 0/10. R knee    Objective       Knee Range of Motion   Right Knee   Active (deg) Passive (deg) Pain   Flexion         Extension -2 0         3-4 degrees quad lag w/ straight leg raise           Treatment:  Therapeutic Exercise  TE 1: Nustep x 5 minutes  TE 2: wedge gastroc stretch 2 minutes  TE 3: standing Hamstring stretch 3 x 30"  TE 4: .  TE 5: .  TE 6: .  TE 7: .  TE 8: .  Balance/Neuromuscular Re-Education  NMR 1: long sitting quad sets: 20 x 3 sec hold, heel propped up on foam mat  NMR 2: SLRs 2 x 10 in long sit w/out strap for assist today  NMR 3: standing closed chain TKE x 20 reps, blue band  NMR 4: standing on foam mat for BLE ex x 20 reps each w/ BUE support at railing: marching, heel/toe raises, hip abd, hip ext  NMR 5: SLS practice on floor and foam mat x 2 min total  NMR 6: .  Therapeutic Activity  TA 1: 6-inch step ups, forward: 20  TA 2: 6-inch step ups, lateral: 20  TA " 3: mini squats on foam: 2 x 10  TA 4: 4-inch step downs, forward: 2 x 10 (not today)  TA 6: sit to stand, 2x10 w/ BUE support to stand, no BUE support to sit    Time Entry(in minutes):  Hot/Cold Pack Time Entry: 8  Neuromuscular Re-Education Time Entry: 19  Therapeutic Activity Time Entry: 18  Therapeutic Exercise Time Entry: 10    Assessment & Plan   Assessment: continues to improve w/ strength and ROM; pt presents w/ possible leg length discrepancy as observed during session in which a heel lift may be beneficial...will continue to assess  Evaluation/Treatment Tolerance: Patient tolerated treatment well    Patient will continue to benefit from skilled outpatient physical therapy to address the deficits listed in the problem list box on initial evaluation, provide pt/family education and to maximize pt's level of independence in the home and community environment.           Plan: Continue per POC and progress as pt able    Goals:   Active       Long term goals       Pt will be independent with progressive HEP       Start:  04/25/25    Expected End:  05/23/25            Pt will increase FOTO score to greater than or equal to 74 in order to demonstrate an increase in functional ability       Start:  04/25/25    Expected End:  05/23/25            Pt will be able to squat to retrieve object from the floor       Start:  04/25/25    Expected End:  05/23/25            Pt will be able to walk about 200 feet and minimal gait deficits with single point straight cane prior to needing to sit and rest       Start:  04/25/25    Expected End:  05/23/25            Pt will report only min pain in the knee with prolonged sitting       Start:  04/25/25    Expected End:  05/23/25               Short term goals       Pt will increase R knee ext to 0 deg in order to make gait pattern more efficient       Start:  04/25/25    Expected End:  05/09/25            Pt will report only mod pain in the knee with prolonged sitting       Start:   04/25/25    Expected End:  05/09/25            Pt will be able to walk about 150 feet with single point straight cane prior to needing to sit and rest       Start:  04/25/25    Expected End:  05/09/25                Sandee Anguiano, PTA

## 2025-05-14 ENCOUNTER — CLINICAL SUPPORT (OUTPATIENT)
Dept: REHABILITATION | Facility: HOSPITAL | Age: 75
End: 2025-05-14
Payer: MEDICARE

## 2025-05-14 DIAGNOSIS — R26.2 DIFFICULTY WALKING: ICD-10-CM

## 2025-05-14 DIAGNOSIS — M25.561 ACUTE PAIN OF RIGHT KNEE: ICD-10-CM

## 2025-05-14 DIAGNOSIS — Z47.1 AFTERCARE FOLLOWING RIGHT KNEE JOINT REPLACEMENT SURGERY: ICD-10-CM

## 2025-05-14 DIAGNOSIS — Z96.651 AFTERCARE FOLLOWING RIGHT KNEE JOINT REPLACEMENT SURGERY: ICD-10-CM

## 2025-05-14 DIAGNOSIS — R53.1 WEAKNESS: ICD-10-CM

## 2025-05-14 DIAGNOSIS — Z96.651 PRESENCE OF RIGHT ARTIFICIAL KNEE JOINT: Primary | ICD-10-CM

## 2025-05-14 PROCEDURE — 97110 THERAPEUTIC EXERCISES: CPT | Mod: CQ

## 2025-05-14 PROCEDURE — 97140 MANUAL THERAPY 1/> REGIONS: CPT | Mod: CQ

## 2025-05-14 PROCEDURE — 97010 HOT OR COLD PACKS THERAPY: CPT | Mod: CQ

## 2025-05-14 NOTE — PROGRESS NOTES
"  Outpatient Rehab    Physical Therapy Visit    Patient Name: Raul Rodriguez  MRN: [id number]  YOB: 1950  Encounter Date: 5/14/2025    Therapy Diagnosis:   No diagnosis found.    Physician: Naeem Carbone, *    Physician Orders: Eval and Treat  Medical Diagnosis: S/P total knee replacement, right    Visit # / Visits Authorized:  8 / 12  Insurance Authorization Period: 4/25/2025 to 4/23/2026  Date of Evaluation: 4/25/2025  Plan of Care Certification: 4/25/2025 to 05/23/2025  FOTO due: last session     PT/PTA: PTA   Number of PTA visits since last PT visit:4  Time In: 0932   Time Out: 1025  Total Time (in minutes): 53   Total Billable Time (in minutes): 33    FOTO:  Intake Score:  %  Survey Score 2:  %  Survey Score 3:  %    Precautions:       Subjective   "My knee leg (R) is just so sore from my hip all the way down to my knee, and on the inside on my knee"..  Pain reported as 3/10. R knee    Objective            Treatment:  Therapeutic Exercise  TE 1: Nustep x 8 minutes  TE 2: wedge gastroc stretch 2 minutes  TE 3: Seated Hamstring stretch 3 x 30"  Manual Therapy  MT 1: STM/MFR to Right IT Band, medial to Right knee, and distal to Right knee prior to Therapeutic exercise on treatment table  Therapeutic Activity  TA 1: 6-inch step ups, forward: 20  TA 2: 6-inch step ups, lateral: 20  TA 3: mini squats  TA 6: sit to stand, 2x10 w/ BUE support to stand, no BUE support to sit  Modalities  Cryotherapy (Minutes\Location): 10 min MHP to Right IT Band prior to manual therapy; 10 min CP to Right knee at end of PT treatment session    Time Entry(in minutes):  Hot/Cold Pack Time Entry: 20  Manual Therapy Time Entry: 14  Therapeutic Exercise Time Entry: 19    Assessment & Plan   Assessment: Noted multiple tight areas in Right lateral thigh and medial to Right knee with palpation which patient reported as "tender".  MHP application to Right lateral hip and thigh prior to manual therapy techiques to " decrease IT Band tightness.  Completed PT treatment with CP application to Right knee.  Patient denies reports of pain at end of PT treatment session.       Patient will continue to benefit from skilled outpatient physical therapy to address the deficits listed in the problem list box on initial evaluation, provide pt/family education and to maximize pt's level of independence in the home and community environment.     Education  Education was done with Patient. The patient's learning style includes Demonstration and Listening. The patient Demonstrates understanding and Verbalizes understanding.         Plan of Care;Home exercise program; Delayed onset muscle soreness        Plan: Continue per POC and progress as pt able    Goals:   Active       Long term goals       Pt will be independent with progressive HEP       Start:  04/25/25    Expected End:  05/23/25            Pt will increase FOTO score to greater than or equal to 74 in order to demonstrate an increase in functional ability       Start:  04/25/25    Expected End:  05/23/25            Pt will be able to squat to retrieve object from the floor       Start:  04/25/25    Expected End:  05/23/25            Pt will be able to walk about 200 feet and minimal gait deficits with single point straight cane prior to needing to sit and rest       Start:  04/25/25    Expected End:  05/23/25            Pt will report only min pain in the knee with prolonged sitting       Start:  04/25/25    Expected End:  05/23/25               Short term goals       Pt will increase R knee ext to 0 deg in order to make gait pattern more efficient       Start:  04/25/25    Expected End:  05/09/25            Pt will report only mod pain in the knee with prolonged sitting       Start:  04/25/25    Expected End:  05/09/25            Pt will be able to walk about 150 feet with single point straight cane prior to needing to sit and rest       Start:  04/25/25    Expected End:  05/09/25                 Miya Samaniego, PTA 5/14/2025

## 2025-05-16 ENCOUNTER — CLINICAL SUPPORT (OUTPATIENT)
Dept: REHABILITATION | Facility: HOSPITAL | Age: 75
End: 2025-05-16
Payer: MEDICARE

## 2025-05-16 DIAGNOSIS — Z96.651 S/P TOTAL KNEE REPLACEMENT, RIGHT: ICD-10-CM

## 2025-05-16 DIAGNOSIS — Z96.651 AFTERCARE FOLLOWING RIGHT KNEE JOINT REPLACEMENT SURGERY: ICD-10-CM

## 2025-05-16 DIAGNOSIS — R26.2 DIFFICULTY WALKING: ICD-10-CM

## 2025-05-16 DIAGNOSIS — Z47.1 AFTERCARE FOLLOWING RIGHT KNEE JOINT REPLACEMENT SURGERY: ICD-10-CM

## 2025-05-16 DIAGNOSIS — Z96.651 PRESENCE OF RIGHT ARTIFICIAL KNEE JOINT: Primary | ICD-10-CM

## 2025-05-16 DIAGNOSIS — M25.561 ACUTE PAIN OF RIGHT KNEE: ICD-10-CM

## 2025-05-16 DIAGNOSIS — R53.1 WEAKNESS: ICD-10-CM

## 2025-05-16 PROCEDURE — 97110 THERAPEUTIC EXERCISES: CPT | Mod: CQ

## 2025-05-16 PROCEDURE — 97530 THERAPEUTIC ACTIVITIES: CPT | Mod: CQ

## 2025-05-16 PROCEDURE — 97112 NEUROMUSCULAR REEDUCATION: CPT | Mod: CQ

## 2025-05-16 NOTE — PROGRESS NOTES
"  Outpatient Rehab    Physical Therapy Visit    Patient Name: Raul Rodriguez  MRN: [id number]  YOB: 1950  Encounter Date: 5/16/2025    Therapy Diagnosis:   No diagnosis found.    Physician: Naeem Carbone, *    Physician Orders: Eval and Treat  Medical Diagnosis: S/P total knee replacement, right    Visit # / Visits Authorized:  9 / 12  Insurance Authorization Period: 4/25/2025 to 4/23/2026  Date of Evaluation: 4/25/2025  Plan of Care Certification: 4/25/2025 to 05/23/2025  FOTO due: last session     PT/PTA: PTA   Number of PTA visits since last PT visit:5  Time In: 0935   Time Out: 1015  Total Time (in minutes): 40   Total Billable Time (in minutes): 40    FOTO:  Intake Score:  %  Survey Score 2:  %  Survey Score 3:  %    Precautions:       Subjective             Objective            Treatment:  Therapeutic Exercise  TE 1: Nustep x 8 minutes  TE 2: wedge gastroc stretch 2 minutes  TE 3: Seated Hamstring stretch 3 x 30"  TE 4: Supine Bridge with ball and blue band for abduction 2 x 10  Balance/Neuromuscular Re-Education  NMR 1: Supine Quad sets 30 x 5"  NMR 2: Supine SAQ's 2 x 10, 2#  NMR 3: Supine SLR's 2 x 10, 2#  Therapeutic Activity  TA 1: 6-inch step ups, forward: 20  TA 2: 6-inch step ups, lateral: 20  TA 3: mini squats  TA 6: sit to stand, 2x10 w/ BUE support to stand, no BUE support to sit    Time Entry(in minutes):  Neuromuscular Re-Education Time Entry: 12  Therapeutic Activity Time Entry: 12  Therapeutic Exercise Time Entry: 16    Assessment & Plan   Assessment: Patient is less reports of pain and tightness in Left Le and medial knee pain.  Patient able to progress throughout tx without increased reports of pain.  Left knee flexion AROM 126 degrees at end of PT treatment session with patient supine .       Patient will continue to benefit from skilled outpatient physical therapy to address the deficits listed in the problem list box on initial evaluation, provide pt/family " education and to maximize pt's level of independence in the home and community environment.             Plan: Continue per POC and progress as pt able    Goals:   Active       Long term goals       Pt will be independent with progressive HEP       Start:  04/25/25    Expected End:  05/23/25            Pt will increase FOTO score to greater than or equal to 74 in order to demonstrate an increase in functional ability       Start:  04/25/25    Expected End:  05/23/25            Pt will be able to squat to retrieve object from the floor       Start:  04/25/25    Expected End:  05/23/25            Pt will be able to walk about 200 feet and minimal gait deficits with single point straight cane prior to needing to sit and rest       Start:  04/25/25    Expected End:  05/23/25            Pt will report only min pain in the knee with prolonged sitting       Start:  04/25/25    Expected End:  05/23/25               Short term goals       Pt will increase R knee ext to 0 deg in order to make gait pattern more efficient       Start:  04/25/25    Expected End:  05/09/25            Pt will report only mod pain in the knee with prolonged sitting       Start:  04/25/25    Expected End:  05/09/25            Pt will be able to walk about 150 feet with single point straight cane prior to needing to sit and rest       Start:  04/25/25    Expected End:  05/09/25                Miya Samaniego, TONI 5/19/2025

## 2025-05-20 ENCOUNTER — CLINICAL SUPPORT (OUTPATIENT)
Dept: REHABILITATION | Facility: HOSPITAL | Age: 75
End: 2025-05-20
Payer: MEDICARE

## 2025-05-20 DIAGNOSIS — Z47.1 AFTERCARE FOLLOWING RIGHT KNEE JOINT REPLACEMENT SURGERY: ICD-10-CM

## 2025-05-20 DIAGNOSIS — R53.1 WEAKNESS: ICD-10-CM

## 2025-05-20 DIAGNOSIS — Z96.651 PRESENCE OF RIGHT ARTIFICIAL KNEE JOINT: Primary | ICD-10-CM

## 2025-05-20 DIAGNOSIS — R26.2 DIFFICULTY WALKING: ICD-10-CM

## 2025-05-20 DIAGNOSIS — M25.561 ACUTE PAIN OF RIGHT KNEE: ICD-10-CM

## 2025-05-20 DIAGNOSIS — Z96.651 AFTERCARE FOLLOWING RIGHT KNEE JOINT REPLACEMENT SURGERY: ICD-10-CM

## 2025-05-20 PROCEDURE — 97110 THERAPEUTIC EXERCISES: CPT

## 2025-05-20 PROCEDURE — 97112 NEUROMUSCULAR REEDUCATION: CPT

## 2025-05-20 NOTE — PROGRESS NOTES
"  Outpatient Rehab    Physical Therapy Visit    Patient Name: Raul Rodriguez  MRN: [id number]  YOB: 1950  Encounter Date: 5/20/2025    Therapy Diagnosis:   Encounter Diagnoses   Name Primary?    Presence of right artificial knee joint Yes    Aftercare following right knee joint replacement surgery     Acute pain of right knee     Weakness     Difficulty walking      Physician: Naeem Carbone, *    Physician Orders: Eval and Treat  Medical Diagnosis: S/P total knee replacement, right    Visit # / Visits Authorized:  9 / 12  Insurance Authorization Period: 4/25/2025 to 4/22/2027  Date of Evaluation: 4/25/2025  Plan of Care Certification: 4/25/2025 to 5/23/2025      Number of PTA visits since last PT visit: 0  Time In: 1100   Time Out: 1153  Total Time (in minutes): 53   Total Billable Time (in minutes):  53 minutes     FOTO:  Intake Score:  59%  Survey Score 2: 54 %  Survey Score 3:  %    Precautions:   None     Subjective   No complaints of pain, just "stiffness"..  Pain reported as 4/10.      Objective       Knee Range of Motion   Right Knee   Active (deg) Passive (deg) Pain   Flexion 121       Extension                       Treatment:  Therapeutic Exercise  TE 1: Nustep x 8 minutes  TE 2: wedge gastroc stretch 2 minutes  TE 3: Seated Hamstring stretch 3 x 30"  TE 4: Seated heel slides 2x10  Balance/Neuromuscular Re-Education  NMR 1: Supine Quad sets 20 x 5"  NMR 2: Supine SAQ's 2 x 10, with 3lb  NMR 3: Supine SLR's 2 x 10, with 3 s/h  NMR 5: SLS practice on foam mat x 2 min total with support of 1 UE  Therapeutic Activity  TA 1: 6-inch step ups, forward: 20  TA 2: 6-inch step ups, lateral: 20  TA 3: mini squats x20  TA 6: sit to stand, 2x10 w/ BUE support to stand, no BUE support to sit    Time Entry(in minutes):  Hot/Cold Pack Time Entry: 6  Neuromuscular Re-Education Time Entry: 22  Therapeutic Exercise Time Entry: 25    Assessment & Plan   Assessment: Patient able to progress " throughout tx without increased reports of pain.  Left knee flexion AROM 121 degrees in sitting.  Evaluation/Treatment Tolerance: Patient tolerated treatment well    Patient will continue to benefit from skilled outpatient physical therapy to address the deficits listed in the problem list box on initial evaluation, provide pt/family education and to maximize pt's level of independence in the home and community environment.     Patient's spiritual, cultural, and educational needs considered and patient agreeable to plan of care and goals.           Plan: Continue per POC and progress as pt able    Goals:   Active       Long term goals       Pt will be independent with progressive HEP       Start:  04/25/25    Expected End:  05/23/25            Pt will increase FOTO score to greater than or equal to 74 in order to demonstrate an increase in functional ability       Start:  04/25/25    Expected End:  05/23/25            Pt will be able to squat to retrieve object from the floor       Start:  04/25/25    Expected End:  05/23/25            Pt will be able to walk about 200 feet and minimal gait deficits with single point straight cane prior to needing to sit and rest       Start:  04/25/25    Expected End:  05/23/25            Pt will report only min pain in the knee with prolonged sitting       Start:  04/25/25    Expected End:  05/23/25               Short term goals       Pt will increase R knee ext to 0 deg in order to make gait pattern more efficient       Start:  04/25/25    Expected End:  05/09/25            Pt will report only mod pain in the knee with prolonged sitting       Start:  04/25/25    Expected End:  05/09/25            Pt will be able to walk about 150 feet with single point straight cane prior to needing to sit and rest       Start:  04/25/25    Expected End:  05/09/25              Joselyn DIMAS DPT was present and supervised the entirety of this treatment.   Drea Cheng, SPT   Joselyn JARA  Kalee, PT, DPT

## 2025-05-21 ENCOUNTER — CLINICAL SUPPORT (OUTPATIENT)
Dept: REHABILITATION | Facility: HOSPITAL | Age: 75
End: 2025-05-21
Payer: MEDICARE

## 2025-05-21 DIAGNOSIS — Z47.1 AFTERCARE FOLLOWING RIGHT KNEE JOINT REPLACEMENT SURGERY: ICD-10-CM

## 2025-05-21 DIAGNOSIS — R26.2 DIFFICULTY WALKING: ICD-10-CM

## 2025-05-21 DIAGNOSIS — R53.1 WEAKNESS: ICD-10-CM

## 2025-05-21 DIAGNOSIS — Z96.651 PRESENCE OF RIGHT ARTIFICIAL KNEE JOINT: Primary | ICD-10-CM

## 2025-05-21 DIAGNOSIS — Z96.651 AFTERCARE FOLLOWING RIGHT KNEE JOINT REPLACEMENT SURGERY: ICD-10-CM

## 2025-05-21 DIAGNOSIS — M25.561 ACUTE PAIN OF RIGHT KNEE: ICD-10-CM

## 2025-05-21 PROCEDURE — 97530 THERAPEUTIC ACTIVITIES: CPT | Mod: CQ

## 2025-05-21 PROCEDURE — 97110 THERAPEUTIC EXERCISES: CPT | Mod: CQ

## 2025-05-21 PROCEDURE — 97112 NEUROMUSCULAR REEDUCATION: CPT | Mod: CQ

## 2025-05-21 NOTE — PROGRESS NOTES
"  Outpatient Rehab    Physical Therapy Visit    Patient Name: Raul Rodriguez  MRN: [id number]  YOB: 1950  Encounter Date: 5/21/2025    Therapy Diagnosis:   Encounter Diagnoses   Name Primary?    Presence of right artificial knee joint Yes    Aftercare following right knee joint replacement surgery     Acute pain of right knee     Weakness     Difficulty walking      Physician: Naeem Carbone, *    Physician Orders: Eval and Treat  Medical Diagnosis: S/P total knee replacement, right    Visit # / Visits Authorized:  11 / 12  Insurance Authorization Period: 4/25/2025 to 4/22/2027  Date of Evaluation: 4/25/2025  Plan of Care Certification: 4/25/2025 to 5/23/2025      PT/PTA:     Number of PTA visits since last PT visit: 1/5  Time In: 0934   Time Out: 1015  Total Time (in minutes): 41   Total Billable Time (in minutes): 40    FOTO:  Intake Score:  %  Survey Score 2:  %  Survey Score 3:  %         Subjective   no c/o.  Pain reported as 0/10. R knee    Objective       Knee Range of Motion   Right Knee   Active (deg) Passive (deg) Pain   Flexion 125 130     Extension -3 0         5-8 degrees quad lag w/ straight leg raise            Treatment:  Therapeutic Exercise  TE 1: Nustep x 8 minutes  TE 2: wedge gastroc stretch 2 minutes  TE 3: Seated Hamstring stretch 3 x 30"  TE 4: Seated heel slides 2x10  Balance/Neuromuscular Re-Education  NMR 1: sitting Quad sets 20 x 5"  NMR 2: sitting SLRs x 20 reps  NMR 3: LAQs x 20 reps  Therapeutic Activity  TA 7: able to ambulate 215 ft w/ cane and focus on TKE w/ heel strike, knee flexion w/ toe off; caution w/ turns due to pts tendency to get dizzy due to vertigo      Time Entry(in minutes):  Neuromuscular Re-Education Time Entry: 15  Therapeutic Activity Time Entry: 10  Therapeutic Exercise Time Entry: 15    Assessment & Plan   Assessment: pt flexion ROM is excellent and he still struggles w/ getting full extension ROM; quad weakness persists; pt would " benefit from continued knee rehab but requests d/c at this time due to limitations from vertigo  Evaluation/Treatment Tolerance: Patient tolerated treatment well    LTG  Pt will be independent with progressive home exercise program MET  Pt will increase FOTO score to greater than or equal to 74 in order to demonstrate an increase in functional ability see chart  Pt will be able to squat to retrieve object from the floor MET  Pt will be able to walk about 200 feet and minimal gait deficits with single point straight cane prior to needing to sit and rest MET  Pt will report only min pain in the knee with prolonged sitting MET  STG  Pt will increase R knee ext to 0 deg in order to make gait pattern more efficient NOT MET  Pt will report only mod pain in the knee with prolonged sitting MET  Pt will be able to walk about 150 feet with single point straight cane prior to needing to sit and rest MET      Plan: Discharge at this time per pt request; see d/c summary in chart per PT    Goals:   Active       Long term goals       Pt will be independent with progressive HEP       Start:  04/25/25    Expected End:  05/23/25            Pt will increase FOTO score to greater than or equal to 74 in order to demonstrate an increase in functional ability       Start:  04/25/25    Expected End:  05/23/25            Pt will be able to squat to retrieve object from the floor       Start:  04/25/25    Expected End:  05/23/25            Pt will be able to walk about 200 feet and minimal gait deficits with single point straight cane prior to needing to sit and rest       Start:  04/25/25    Expected End:  05/23/25            Pt will report only min pain in the knee with prolonged sitting       Start:  04/25/25    Expected End:  05/23/25               Short term goals       Pt will increase R knee ext to 0 deg in order to make gait pattern more efficient       Start:  04/25/25    Expected End:  05/09/25            Pt will report only mod  pain in the knee with prolonged sitting       Start:  04/25/25    Expected End:  05/09/25            Pt will be able to walk about 150 feet with single point straight cane prior to needing to sit and rest       Start:  04/25/25    Expected End:  05/09/25                Sandee Anguiano, PTA

## 2025-05-23 ENCOUNTER — DOCUMENTATION ONLY (OUTPATIENT)
Dept: REHABILITATION | Facility: HOSPITAL | Age: 75
End: 2025-05-23
Payer: MEDICARE

## 2025-05-23 NOTE — PROGRESS NOTES
[facility]  PT Discharge Note    Name: Raul Rodriguez  [facility] Number: [id number]    Therapy Diagnosis:        Encounter Diagnoses   Name Primary?    Presence of right artificial knee joint Yes    Aftercare following right knee joint replacement surgery      Acute pain of right knee      Weakness      Difficulty walking        Physician: Naeem Carbone, *     Physician Orders: Eval and Treat  Medical Diagnosis: S/P total knee replacement, right  Date of Evaluation: 4/25/2025   Date of Last visit: 5/21/2025  Total Visits Received: 11    ASSESSMENT      Patient requested discharge to home exercise program stating that he will continue his exercise at home. He also states that he feels he is doing well with his progress.     Discharge reason: Patient requested discharge    Discharge FOTO Score: 59%    Goals:   Pt will be independent with progressive home exercise program MET  Pt will increase FOTO score to greater than or equal to 74 in order to demonstrate an increase in functional ability- NOT MET   Pt will be able to squat to retrieve object from the floor MET  Pt will be able to walk about 200 feet and minimal gait deficits with single point straight cane prior to needing to sit and rest MET  Pt will report only min pain in the knee with prolonged sitting MET  STG  Pt will increase R knee ext to 0 deg in order to make gait pattern more efficient NOT MET  Pt will report only mod pain in the knee with prolonged sitting MET  Pt will be able to walk about 150 feet with single point straight cane prior to needing to sit and rest MET    PLAN   This patient is discharged from Physical Therapy      Joselyn Granda, PT, DPT

## 2025-06-30 ENCOUNTER — PATIENT MESSAGE (OUTPATIENT)
Dept: NEUROLOGY | Facility: CLINIC | Age: 75
End: 2025-06-30
Payer: MEDICARE

## 2025-07-01 ENCOUNTER — OFFICE VISIT (OUTPATIENT)
Dept: NEUROLOGY | Facility: CLINIC | Age: 75
End: 2025-07-01
Payer: MEDICARE

## 2025-07-01 VITALS
SYSTOLIC BLOOD PRESSURE: 119 MMHG | WEIGHT: 171.38 LBS | OXYGEN SATURATION: 98 % | HEIGHT: 67 IN | DIASTOLIC BLOOD PRESSURE: 73 MMHG | BODY MASS INDEX: 26.9 KG/M2 | HEART RATE: 70 BPM

## 2025-07-01 DIAGNOSIS — R51.9 ACUTE INTRACTABLE HEADACHE, UNSPECIFIED HEADACHE TYPE: ICD-10-CM

## 2025-07-01 DIAGNOSIS — R51.9 FACIAL PAIN: Primary | ICD-10-CM

## 2025-07-01 PROCEDURE — 99212 OFFICE O/P EST SF 10 MIN: CPT | Mod: S$PBB,,, | Performed by: NURSE PRACTITIONER

## 2025-07-01 PROCEDURE — 99215 OFFICE O/P EST HI 40 MIN: CPT | Mod: PBBFAC | Performed by: NURSE PRACTITIONER

## 2025-07-01 PROCEDURE — 99999 PR PBB SHADOW E&M-EST. PATIENT-LVL V: CPT | Mod: PBBFAC,,, | Performed by: NURSE PRACTITIONER

## 2025-07-01 RX ORDER — ACYCLOVIR 400 MG/1
TABLET ORAL
COMMUNITY

## 2025-07-01 RX ORDER — MELOXICAM 7.5 MG/1
TABLET ORAL
COMMUNITY
Start: 2025-03-03

## 2025-07-01 RX ORDER — IBUPROFEN 800 MG/1
800 TABLET, FILM COATED ORAL
COMMUNITY
Start: 2025-02-28

## 2025-07-01 NOTE — PROGRESS NOTES
Subjective:       Patient ID: Raul Rodriguez is a 74 y.o. male     Chief Complaint:    Chief Complaint   Patient presents with    Follow-up        Allergies:  Ace inhibitors and Arb-angiotensin receptor antagonist    Current Medications:    Outpatient Encounter Medications as of 7/1/2025   Medication Sig Dispense Refill    acyclovir (ZOVIRAX) 400 MG tablet Take 1 tablet 5 times a day by oral route for 7 days.      albuterol (PROVENTIL) 2.5 mg /3 mL (0.083 %) nebulizer solution Inhale 2.5 mg into the lungs.      aspirin 81 MG Chew Take 81 mg by mouth as needed.      baclofen (LIORESAL) 10 MG tablet Take 10 mg by mouth.      budesonide-formoterol 160-4.5 mcg (SYMBICORT) 160-4.5 mcg/actuation HFAA Inhale 2 puffs into the lungs every 12 (twelve) hours. Controller      cyanocobalamin 1,000 mcg/mL injection 1,000 mcg.      DULoxetine (CYMBALTA) 60 MG capsule Take 60 mg by mouth once daily.      EPINEPHrine (EPIPEN JR) 0.15 mg/0.3 mL pen injection Inject 0.15 mg into the muscle as needed for Anaphylaxis.      ergocalciferol (ERGOCALCIFEROL) 50,000 unit Cap       esomeprazole (NEXIUM) 40 MG capsule Take 40 mg by mouth before breakfast.      hydroxychloroquine (PLAQUENIL) 200 mg tablet Take 1 tablet twice a day by oral route.      ibuprofen (ADVIL,MOTRIN) 800 MG tablet Take 800 mg by mouth.      lamoTRIgine (LAMICTAL) 100 MG tablet Take 1 tablet (100 mg total) by mouth 2 (two) times daily. 60 tablet 11    LIDOcaine HCL 10 mg/ml, 1%, 10 mg/mL (1 %) injection Inject 2 mLs into the articular space.      meloxicam (MOBIC) 7.5 MG tablet       metoprolol succinate (TOPROL-XL) 50 MG 24 hr tablet Take 50 mg by mouth.      NIFEdipine (ADALAT CC) 30 MG TbSR Take 30 mg by mouth.      NIFEdipine (PROCARDIA-XL) 30 MG (OSM) 24 hr tablet       omalizumab (XOLAIR) 75 mg/0.5 mL injection Inject 75 mg into the skin every 28 days.      oxyCODONE (ROXICODONE) 15 MG Tab Take 15 mg by mouth 3 (three) times daily.      prednisoLONE  acetate (PRED FORTE) 1 % DrpS Place 1 drop into the left eye 4 (four) times daily.      tacrolimus (PROTOPIC) 0.1 % ointment 1 application  2 (two) times daily.      tadalafiL (CIALIS) 20 MG Tab Take 10 mg by mouth once daily.      testosterone cypionate (DEPOTESTOTERONE CYPIONATE) 200 mg/mL injection Inject 1 mL every 2 weeks by intramuscular route.      triamcinolone acetonide (KENALOG-40) 40 mg/mL injection Inject 40 mg into the articular space.      VENTOLIN HFA 90 mcg/actuation inhaler Inhale 2 puffs into the lungs as needed.      zoledronic acid-mannitol & water (RECLAST) 5 mg/100 mL PgBk 5mg iv infusion once a year      zolpidem (AMBIEN) 10 mg Tab Take 5 mg by mouth nightly as needed.      amLODIPine (NORVASC) 10 MG tablet Take 10 mg by mouth. (Patient not taking: Reported on 7/1/2025)      amoxicillin-clavulanate 500-125mg (AUGMENTIN) 500-125 mg Tab Take 1 tablet (500 mg total) by mouth 2 (two) times daily. (Patient not taking: Reported on 7/1/2025) 28 tablet 0    amoxicillin-clavulanate 500-125mg (AUGMENTIN) 500-125 mg Tab Take 1 tablet (500 mg total) by mouth 2 (two) times daily. (Patient not taking: Reported on 7/1/2025) 28 tablet 0    amoxicillin-clavulanate 875-125mg (AUGMENTIN) 875-125 mg per tablet Take 1 tablet by mouth 2 (two) times daily. (Patient not taking: Reported on 7/1/2025) 14 tablet 0    azelastine (ASTELIN) 137 mcg (0.1 %) nasal spray 1 spray (137 mcg total) by Nasal route 2 (two) times daily. 30 mL 1    bisacodyl (DULCOLAX) 5 mg EC tablet Take 5 mg by mouth as needed for Constipation. (Patient not taking: Reported on 7/1/2025)      doxazosin (CARDURA) 2 MG tablet Take 2 mg by mouth. (Patient not taking: Reported on 7/1/2025)      dutasteride (AVODART) 0.5 mg capsule Take 0.5 mg by mouth. (Patient not taking: Reported on 7/1/2025)      eplerenone (INSPRA) 25 MG Tab Take 25 mg by mouth once daily. (Patient not taking: Reported on 7/1/2025)      fentaNYL (DURAGESIC) 12 mcg/hr PT72 Place 1  patch onto the skin. (Patient not taking: Reported on 7/1/2025)      GEMTESA 75 mg Tab  (Patient not taking: Reported on 7/1/2025)      LORazepam (ATIVAN) 1 MG tablet Take 1 tablet 30 minutes prior to mri and take 1 tablet at time of mri (Patient not taking: Reported on 7/1/2025) 2 tablet 0    naloxone (NARCAN) 4 mg/actuation Spry 1 spray by Nasal route daily as needed. (Patient not taking: Reported on 7/1/2025)      naproxen (NAPROSYN) 500 MG tablet Take 500 mg by mouth 2 (two) times daily with meals. (Patient not taking: Reported on 7/1/2025)      nebulizer and compressor Jaylyn Use as directed.  Dispense nebulizer, tubing and mask/mouth piece. (Patient not taking: Reported on 7/1/2025)      neomycin-polymyxin-hydrocortisone (CORTISPORIN) 3.5-10,000-1 mg/mL-unit/mL-% otic suspension Place 3 drops into the left ear 2 (two) times daily. (Patient not taking: Reported on 7/1/2025) 10 mL 0    nortriptyline (PAMELOR) 50 MG capsule Take 50 mg by mouth every evening. (Patient not taking: Reported on 7/1/2025)      oxybutynin (DITROPAN-XL) 10 MG 24 hr tablet Take 10 mg by mouth. (Patient not taking: Reported on 7/1/2025)      oxybutynin (DITROPAN-XL) 5 MG TR24 TAKE 1 TABLET BY MOUTH DAILY FOR BLADDER CONTROL (Patient not taking: Reported on 7/1/2025)      oxyCODONE-acetaminophen (PERCOCET)  mg per tablet Take 1 tablet every 6 hours by oral route. (Patient not taking: Reported on 7/1/2025)      polyethylene glycol (GAVILYTE-G) 236-22.74-6.74 -5.86 gram suspension  (Patient not taking: Reported on 7/1/2025)      polyethylene glycol (GLYCOLAX) 17 gram PwPk Take by mouth. (Patient not taking: Reported on 7/1/2025)      pregabalin (LYRICA) 50 MG capsule Take 50 mg by mouth. (Patient not taking: Reported on 7/1/2025)      sildenafil (REVATIO) 20 mg Tab Take 20 mg by mouth 2 (two) times a day. (Patient not taking: Reported on 7/1/2025)      sumatriptan (IMITREX) 100 MG tablet Take 100 mg by mouth daily as needed for Migraine.  "(Patient not taking: Reported on 7/1/2025)      trospium (SANCTURA) 20 mg Tab tablet  (Patient not taking: Reported on 7/1/2025)       No facility-administered encounter medications on file as of 7/1/2025.       History of Present Illness  75 y/o male following in neurology clinic for chronic left facial pain    Per the EMR, this is long standing.  Records in the EMR indicate this ongoing since at least 2012.  Per prior note of Dr. Cherry in [facility] neurology at [facility] in 2014, patient had already at that time been evaluated here in [facility], and in [place].  He was actually seen here by Dr. Denis in 2014, but this was for rule out of ALS, at that time was following with the neurology at [facility].    Had gamma knife procedure done without benefit, and prior treated with lyrica, neurontin without benefit.  Had been seen in pain treatment locally with Dr. Jensen and had local injections without benefit.  I note in records from [place] in 2018 he actually had MRI brain done there with emphasis on the trigeminal nerves and no abnormalities were noted except the prior gamma knife procedure changes, though there was also reported history of chiari malformation surgery with occipital craniotomy and removal of posterior arch of C1 and C2 due to the chiari malformation.  Apparently he even had a stimulator placed at [place] in 2019 by his report but he had to have it removed because he said it was causing him too much "pull" every time he turned his head.    I did try him a Rx for Tegretol, however his pharmacy would not fill it apparently due to him being on procardia and oxycodone?  He was followed in chronic pain by Dr. Enamorado in [place], [place] for this same complaint, treated for it with oxycodone and cymbalta, with prior pamelor, lyrica, and neurontin.  He reports he will be following with Dr. Jensen later this month as Dr. Enamorado has since moved his practice.  Though the incident with tegretol and procardia is not severe, I " disucssed then trial with lamictal and tapering to 100mg bid dosing, but he denies any benefit today with this treatment and thus plan to taper him off of it.    Review of the records indicates extensive workup and treatment for thsi condition, including facial nerve specialty and specialty procedures.  I feel quite certain he likely was prior treated with tegretol as it is first line treatment for neuralgia but I do not have any clarification it was used.  I did obtain updated MRI brain and cervical spine, which noted no acute findings, did reflect prior surgical intervention with mild stenosis at C4-5 and C5-6, no cord signal abnormalities.           Review of Systems  Review of Systems   Constitutional:  Negative for diaphoresis and fever.   HENT:  Negative for congestion, hearing loss and tinnitus.    Eyes:  Negative for blurred vision, double vision, photophobia, discharge and redness.   Respiratory:  Negative for cough and shortness of breath.    Cardiovascular:  Negative for chest pain.   Gastrointestinal:  Negative for abdominal pain, nausea and vomiting.   Musculoskeletal:  Positive for back pain, falls, myalgias and neck pain. Negative for joint pain.   Skin:  Negative for itching and rash.   Neurological:  Positive for dizziness, tingling, sensory change and headaches. Negative for tremors, speech change, focal weakness, seizures, loss of consciousness and weakness.   Psychiatric/Behavioral:  Negative for depression, hallucinations and memory loss. The patient does not have insomnia.    All other systems reviewed and are negative.     Objective:     NEUROLOGICAL EXAMINATION:     MENTAL STATUS   Oriented to person, place, and time.   Attention: normal. Concentration: normal.   Speech: speech is normal   Level of consciousness: alert  Knowledge: good and consistent with education.   Normal comprehension.     CRANIAL NERVES     CN II   Visual fields full to confrontation.   Visual acuity: normal  Right  visual field deficit: none  Left visual field deficit: none     CN III, IV, VI   Pupils are equal, round, and reactive to light.  Extraocular motions are normal.   Right pupil: Size: 3 mm. Shape: regular. Reactivity: brisk. Consensual response: intact. Accommodation: intact.   Left pupil: Size: 3 mm. Shape: regular. Reactivity: brisk. Consensual response: intact. Accommodation: intact.   CN III: no CN III palsy  CN VI: no CN VI palsy  Nystagmus: none   Diplopia: none  Upgaze: normal  Downgaze: normal  Conjugate gaze: present  Vestibulo-ocular reflex: present    CN V   Facial sensation intact.   Right facial sensation deficit: none  Left facial sensation deficit: none  Right corneal reflex: normal  Left corneal reflex: normal    CN VII   Facial expression full, symmetric.   Right facial weakness: none  Left facial weakness: peripheral  Left taste: normal    CN VIII   CN VIII normal.   Hearing: intact  Right Rinne: AC > BC  Left Rinne: BC > AC  Springer: lateralizes right     CN IX, X   CN IX normal.   CN X normal.   Palate: symmetric    CN XI   CN XI normal.   Right sternocleidomastoid strength: normal  Left sternocleidomastoid strength: normal  Right trapezius strength: normal  Left trapezius strength: normal    CN XII   CN XII normal.   Tongue: not atrophic  Fasciculations: absent  Tongue deviation: none    MOTOR EXAM   Muscle bulk: normal  Overall muscle tone: normal  Right arm tone: normal  Left arm tone: normal  Right arm pronator drift: absent  Left arm pronator drift: absent  Right leg tone: normal  Left leg tone: normal    Strength   Right neck flexion: 5/5  Left neck flexion: 5/5  Right neck extension: 5/5  Left neck extension: 5/5  Right deltoid: 5/5  Left deltoid: 5/5  Right biceps: 5/5  Left biceps: 5/5  Right triceps: 5/5  Left triceps: 5/5  Right wrist flexion: 5/5  Left wrist flexion: 5/5  Right wrist extension: 5/5  Left wrist extension: 5/5  Right interossei: 5/5  Left interossei: 5/5  Right iliopsoas:  5/5  Left iliopsoas: 5/5  Right quadriceps: 5/5  Left quadriceps: 5/5  Right hamstrin/5  Left hamstrin/5  Right anterior tibial: 5/5  Left anterior tibial: 5/5  Right posterior tibial: 5/5  Left posterior tibial: 5/5  Right gastroc: 5/5  Left gastroc: 5/5    REFLEXES     Reflexes   Right brachioradialis: 2+  Left brachioradialis: 2+  Right biceps: 2+  Left biceps: 2+  Right triceps: 2+  Left triceps: 2+  Right patellar: 2+  Left patellar: 2+  Right achilles: 2+  Left achilles: 2+  Right plantar: normal  Left plantar: normal  Right Son: absent  Left Son: absent  Right ankle clonus: absent  Left ankle clonus: absent  Right pendular knee jerk: absent  Left pendular knee jerk: absent    SENSORY EXAM   Light touch normal.   Right arm light touch: normal  Left arm light touch: normal  Right leg light touch: normal  Left leg light touch: normal  Vibration normal.   Right arm vibration: normal  Left arm vibration: normal  Right leg vibration: normal  Left leg vibration: normal  Proprioception normal.   Right arm proprioception: normal  Left arm proprioception: normal  Right leg proprioception: normal  Left leg proprioception: normal  Pinprick normal.   Right arm pinprick: normal  Left arm pinprick: normal  Right leg pinprick: normal  Left leg pinprick: normal  Graphesthesia: normal  Romberg: negative  Stereognosis: normal    GAIT AND COORDINATION     Gait  Gait: normal     Coordination   Finger to nose coordination: normal  Heel to shin coordination: normal  Tandem walking coordination: normal    Tremor   Resting tremor: absent  Intention tremor: absent  Action tremor: absent       Physical Exam  Vitals and nursing note reviewed.   Constitutional:       Appearance: Normal appearance.   HENT:      Head: Normocephalic.      Ears:      Springer exam findings: Lateralizes right.     Right Rinne: AC > BC.     Left Rinne: BC > AC.  Eyes:      Extraocular Movements: EOM normal.      Pupils: Pupils are equal, round,  and reactive to light.   Cardiovascular:      Rate and Rhythm: Normal rate and regular rhythm.   Pulmonary:      Effort: Pulmonary effort is normal.   Musculoskeletal:         General: Normal range of motion.      Cervical back: Normal range of motion and neck supple.   Skin:     General: Skin is warm and dry.   Neurological:      General: No focal deficit present.      Mental Status: He is alert and oriented to person, place, and time.      Cranial Nerves: No cranial nerve deficit.      Sensory: No sensory deficit.      Motor: No weakness.      Coordination: Coordination normal. Finger-Nose-Finger Test, Heel to Shin Test and Romberg Test normal.      Gait: Gait is intact. Gait and tandem walk normal.      Deep Tendon Reflexes: Reflexes normal.      Reflex Scores:       Tricep reflexes are 2+ on the right side and 2+ on the left side.       Bicep reflexes are 2+ on the right side and 2+ on the left side.       Brachioradialis reflexes are 2+ on the right side and 2+ on the left side.       Patellar reflexes are 2+ on the right side and 2+ on the left side.       Achilles reflexes are 2+ on the right side and 2+ on the left side.  Psychiatric:         Mood and Affect: Mood normal.         Speech: Speech normal.         Behavior: Behavior normal.          Assessment:     Problem List Items Addressed This Visit          Neuro    Acute intractable headache - Primary            Primary Diagnosis and ICD10  Facial pain [R51.9]    Plan:     Patient Instructions   Decrease lamictal to 1/2 tablet twice daily for 2 weeks then 1/2 tablet daily for 2 weeks then stop  Reviewed records present in the EMR  Keep follow-up with Dr. Jensen as scheduled      There are no discontinued medications.      Requested Prescriptions      No prescriptions requested or ordered in this encounter       No orders of the defined types were placed in this encounter.           Spine appears normal, range of motion is not limited, no muscle or joint tenderness

## 2025-07-01 NOTE — PATIENT INSTRUCTIONS
Decrease lamictal to 1/2 tablet twice daily for 2 weeks then 1/2 tablet daily for 2 weeks then stop  Reviewed records present in the EMR  Keep follow-up with Dr. Jensen as scheduled

## 2025-08-18 DIAGNOSIS — Z96.651 STATUS POST REVISION OF TOTAL KNEE REPLACEMENT, RIGHT: Primary | ICD-10-CM

## 2025-08-21 ENCOUNTER — CLINICAL SUPPORT (OUTPATIENT)
Dept: REHABILITATION | Facility: HOSPITAL | Age: 75
End: 2025-08-21
Payer: MEDICARE

## 2025-08-21 DIAGNOSIS — R26.2 DIFFICULTY WALKING: ICD-10-CM

## 2025-08-21 DIAGNOSIS — R53.1 WEAKNESS: ICD-10-CM

## 2025-08-21 DIAGNOSIS — M25.561 ACUTE PAIN OF RIGHT KNEE: ICD-10-CM

## 2025-08-21 DIAGNOSIS — Z47.1 AFTERCARE FOLLOWING RIGHT KNEE JOINT REPLACEMENT SURGERY: ICD-10-CM

## 2025-08-21 DIAGNOSIS — Z96.651 PRESENCE OF RIGHT ARTIFICIAL KNEE JOINT: Primary | ICD-10-CM

## 2025-08-21 DIAGNOSIS — Z96.651 AFTERCARE FOLLOWING RIGHT KNEE JOINT REPLACEMENT SURGERY: ICD-10-CM

## 2025-08-21 PROCEDURE — 97161 PT EVAL LOW COMPLEX 20 MIN: CPT

## 2025-08-21 PROCEDURE — 97140 MANUAL THERAPY 1/> REGIONS: CPT

## 2025-08-21 PROCEDURE — 97110 THERAPEUTIC EXERCISES: CPT

## 2025-08-21 PROCEDURE — 97010 HOT OR COLD PACKS THERAPY: CPT

## 2025-08-22 ENCOUNTER — CLINICAL SUPPORT (OUTPATIENT)
Dept: REHABILITATION | Facility: HOSPITAL | Age: 75
End: 2025-08-22
Payer: MEDICARE

## 2025-08-22 DIAGNOSIS — M25.561 ACUTE PAIN OF RIGHT KNEE: ICD-10-CM

## 2025-08-22 DIAGNOSIS — R26.2 DIFFICULTY WALKING: ICD-10-CM

## 2025-08-22 DIAGNOSIS — Z96.651 PRESENCE OF RIGHT ARTIFICIAL KNEE JOINT: Primary | ICD-10-CM

## 2025-08-22 DIAGNOSIS — R53.1 WEAKNESS: ICD-10-CM

## 2025-08-22 DIAGNOSIS — Z47.1 AFTERCARE FOLLOWING RIGHT KNEE JOINT REPLACEMENT SURGERY: ICD-10-CM

## 2025-08-22 DIAGNOSIS — Z96.651 AFTERCARE FOLLOWING RIGHT KNEE JOINT REPLACEMENT SURGERY: ICD-10-CM

## 2025-08-22 PROCEDURE — 97110 THERAPEUTIC EXERCISES: CPT

## 2025-08-22 PROCEDURE — 97112 NEUROMUSCULAR REEDUCATION: CPT

## 2025-08-22 PROCEDURE — 97010 HOT OR COLD PACKS THERAPY: CPT

## 2025-08-22 PROCEDURE — 97140 MANUAL THERAPY 1/> REGIONS: CPT

## 2025-08-25 ENCOUNTER — CLINICAL SUPPORT (OUTPATIENT)
Dept: REHABILITATION | Facility: HOSPITAL | Age: 75
End: 2025-08-25
Payer: MEDICARE

## 2025-08-25 DIAGNOSIS — R53.1 WEAKNESS: ICD-10-CM

## 2025-08-25 DIAGNOSIS — Z96.651 AFTERCARE FOLLOWING RIGHT KNEE JOINT REPLACEMENT SURGERY: ICD-10-CM

## 2025-08-25 DIAGNOSIS — Z47.1 AFTERCARE FOLLOWING RIGHT KNEE JOINT REPLACEMENT SURGERY: ICD-10-CM

## 2025-08-25 DIAGNOSIS — R26.2 DIFFICULTY WALKING: ICD-10-CM

## 2025-08-25 DIAGNOSIS — Z96.651 PRESENCE OF RIGHT ARTIFICIAL KNEE JOINT: Primary | ICD-10-CM

## 2025-08-25 DIAGNOSIS — M25.561 ACUTE PAIN OF RIGHT KNEE: ICD-10-CM

## 2025-08-25 PROCEDURE — 97112 NEUROMUSCULAR REEDUCATION: CPT | Mod: CQ

## 2025-08-25 PROCEDURE — 97530 THERAPEUTIC ACTIVITIES: CPT | Mod: CQ

## 2025-08-25 PROCEDURE — 97140 MANUAL THERAPY 1/> REGIONS: CPT | Mod: CQ

## 2025-08-25 PROCEDURE — 97110 THERAPEUTIC EXERCISES: CPT | Mod: CQ

## 2025-08-26 ENCOUNTER — CLINICAL SUPPORT (OUTPATIENT)
Dept: REHABILITATION | Facility: HOSPITAL | Age: 75
End: 2025-08-26
Payer: MEDICARE

## 2025-08-26 DIAGNOSIS — Z96.651 PRESENCE OF RIGHT ARTIFICIAL KNEE JOINT: Primary | ICD-10-CM

## 2025-08-26 DIAGNOSIS — M25.561 ACUTE PAIN OF RIGHT KNEE: ICD-10-CM

## 2025-08-26 DIAGNOSIS — Z96.651 AFTERCARE FOLLOWING RIGHT KNEE JOINT REPLACEMENT SURGERY: ICD-10-CM

## 2025-08-26 DIAGNOSIS — R26.2 DIFFICULTY WALKING: ICD-10-CM

## 2025-08-26 DIAGNOSIS — R53.1 WEAKNESS: ICD-10-CM

## 2025-08-26 DIAGNOSIS — Z47.1 AFTERCARE FOLLOWING RIGHT KNEE JOINT REPLACEMENT SURGERY: ICD-10-CM

## 2025-08-26 PROCEDURE — 97140 MANUAL THERAPY 1/> REGIONS: CPT

## 2025-08-26 PROCEDURE — 97112 NEUROMUSCULAR REEDUCATION: CPT

## 2025-08-26 PROCEDURE — 97110 THERAPEUTIC EXERCISES: CPT

## 2025-08-27 ENCOUNTER — HOSPITAL ENCOUNTER (OUTPATIENT)
Dept: RADIOLOGY | Facility: HOSPITAL | Age: 75
Discharge: HOME | End: 2025-08-27
Attending: FAMILY MEDICINE
Payer: MEDICARE

## 2025-08-27 DIAGNOSIS — R51.9 FACE PAIN: Primary | ICD-10-CM

## 2025-08-27 DIAGNOSIS — R51.9 PAIN IN FACE: ICD-10-CM

## 2025-08-27 DIAGNOSIS — R51.9 FACE PAIN: ICD-10-CM

## 2025-08-27 PROCEDURE — 70486 CT MAXILLOFACIAL W/O DYE: CPT | Mod: TC

## 2025-09-02 ENCOUNTER — CLINICAL SUPPORT (OUTPATIENT)
Dept: REHABILITATION | Facility: HOSPITAL | Age: 75
End: 2025-09-02
Payer: MEDICARE

## 2025-09-02 DIAGNOSIS — Z96.651 AFTERCARE FOLLOWING RIGHT KNEE JOINT REPLACEMENT SURGERY: ICD-10-CM

## 2025-09-02 DIAGNOSIS — R53.1 WEAKNESS: ICD-10-CM

## 2025-09-02 DIAGNOSIS — R26.2 DIFFICULTY WALKING: ICD-10-CM

## 2025-09-02 DIAGNOSIS — M25.561 ACUTE PAIN OF RIGHT KNEE: ICD-10-CM

## 2025-09-02 DIAGNOSIS — Z96.651 PRESENCE OF RIGHT ARTIFICIAL KNEE JOINT: Primary | ICD-10-CM

## 2025-09-02 DIAGNOSIS — Z47.1 AFTERCARE FOLLOWING RIGHT KNEE JOINT REPLACEMENT SURGERY: ICD-10-CM

## 2025-09-02 PROCEDURE — 97112 NEUROMUSCULAR REEDUCATION: CPT

## 2025-09-02 PROCEDURE — 97110 THERAPEUTIC EXERCISES: CPT

## 2025-09-02 PROCEDURE — 97010 HOT OR COLD PACKS THERAPY: CPT

## 2025-09-03 ENCOUNTER — CLINICAL SUPPORT (OUTPATIENT)
Dept: REHABILITATION | Facility: HOSPITAL | Age: 75
End: 2025-09-03
Payer: MEDICARE

## 2025-09-03 DIAGNOSIS — Z47.1 AFTERCARE FOLLOWING RIGHT KNEE JOINT REPLACEMENT SURGERY: ICD-10-CM

## 2025-09-03 DIAGNOSIS — R53.1 WEAKNESS: ICD-10-CM

## 2025-09-03 DIAGNOSIS — R26.2 DIFFICULTY WALKING: ICD-10-CM

## 2025-09-03 DIAGNOSIS — M25.561 ACUTE PAIN OF RIGHT KNEE: ICD-10-CM

## 2025-09-03 DIAGNOSIS — Z96.651 PRESENCE OF RIGHT ARTIFICIAL KNEE JOINT: Primary | ICD-10-CM

## 2025-09-03 DIAGNOSIS — Z96.651 AFTERCARE FOLLOWING RIGHT KNEE JOINT REPLACEMENT SURGERY: ICD-10-CM

## 2025-09-03 PROCEDURE — 97110 THERAPEUTIC EXERCISES: CPT | Mod: CQ

## 2025-09-03 PROCEDURE — 97010 HOT OR COLD PACKS THERAPY: CPT | Mod: CQ

## 2025-09-03 PROCEDURE — 97112 NEUROMUSCULAR REEDUCATION: CPT | Mod: CQ

## 2025-09-05 ENCOUNTER — CLINICAL SUPPORT (OUTPATIENT)
Dept: REHABILITATION | Facility: HOSPITAL | Age: 75
End: 2025-09-05
Payer: MEDICARE

## 2025-09-05 DIAGNOSIS — Z96.651 PRESENCE OF RIGHT ARTIFICIAL KNEE JOINT: Primary | ICD-10-CM

## 2025-09-05 DIAGNOSIS — Z47.1 AFTERCARE FOLLOWING RIGHT KNEE JOINT REPLACEMENT SURGERY: ICD-10-CM

## 2025-09-05 DIAGNOSIS — Z96.651 AFTERCARE FOLLOWING RIGHT KNEE JOINT REPLACEMENT SURGERY: ICD-10-CM

## 2025-09-05 DIAGNOSIS — R26.2 DIFFICULTY WALKING: ICD-10-CM

## 2025-09-05 DIAGNOSIS — M25.561 ACUTE PAIN OF RIGHT KNEE: ICD-10-CM

## 2025-09-05 DIAGNOSIS — R53.1 WEAKNESS: ICD-10-CM

## 2025-09-05 PROCEDURE — 97112 NEUROMUSCULAR REEDUCATION: CPT

## 2025-09-05 PROCEDURE — 97110 THERAPEUTIC EXERCISES: CPT

## 2025-09-05 PROCEDURE — 97010 HOT OR COLD PACKS THERAPY: CPT

## 2025-09-05 PROCEDURE — 97140 MANUAL THERAPY 1/> REGIONS: CPT

## (undated) DEVICE — COTTONBALL LARGE STRL

## (undated) DEVICE — BLADE SPEAR TIP BEAVER 45DEG

## (undated) DEVICE — TUBE SUCTION MEDI-VAC STERILE

## (undated) DEVICE — GLOVE BIOGEL SKINSENSE PI 7.5